# Patient Record
Sex: MALE | Race: WHITE | NOT HISPANIC OR LATINO | Employment: UNEMPLOYED | URBAN - METROPOLITAN AREA
[De-identification: names, ages, dates, MRNs, and addresses within clinical notes are randomized per-mention and may not be internally consistent; named-entity substitution may affect disease eponyms.]

---

## 2021-04-29 ENCOUNTER — APPOINTMENT (EMERGENCY)
Dept: RADIOLOGY | Facility: HOSPITAL | Age: 61
End: 2021-04-29
Payer: COMMERCIAL

## 2021-04-29 ENCOUNTER — HOSPITAL ENCOUNTER (EMERGENCY)
Facility: HOSPITAL | Age: 61
Discharge: HOME | End: 2021-04-30
Attending: EMERGENCY MEDICINE
Payer: COMMERCIAL

## 2021-04-29 VITALS
DIASTOLIC BLOOD PRESSURE: 77 MMHG | OXYGEN SATURATION: 99 % | RESPIRATION RATE: 16 BRPM | TEMPERATURE: 98.7 F | HEART RATE: 64 BPM | SYSTOLIC BLOOD PRESSURE: 141 MMHG

## 2021-04-29 DIAGNOSIS — M70.20 OLECRANON BURSITIS: ICD-10-CM

## 2021-04-29 DIAGNOSIS — M25.521 ELBOW PAIN, RIGHT: Primary | ICD-10-CM

## 2021-04-29 LAB
ANION GAP SERPL CALCULATED.3IONS-SCNC: 7 MMOL/L (ref 4–13)
BASOPHILS # BLD AUTO: 0.05 THOUSANDS/ΜL (ref 0–0.1)
BASOPHILS NFR BLD AUTO: 1 % (ref 0–1)
BUN SERPL-MCNC: 15 MG/DL (ref 5–25)
CALCIUM SERPL-MCNC: 9.6 MG/DL (ref 8.3–10.1)
CHLORIDE SERPL-SCNC: 105 MMOL/L (ref 100–108)
CO2 SERPL-SCNC: 30 MMOL/L (ref 21–32)
CREAT SERPL-MCNC: 1.03 MG/DL (ref 0.6–1.3)
EOSINOPHIL # BLD AUTO: 0.09 THOUSAND/ΜL (ref 0–0.61)
EOSINOPHIL NFR BLD AUTO: 2 % (ref 0–6)
ERYTHROCYTE [DISTWIDTH] IN BLOOD BY AUTOMATED COUNT: 13.5 % (ref 11.6–15.1)
GFR SERPL CREATININE-BSD FRML MDRD: 79 ML/MIN/1.73SQ M
GLUCOSE SERPL-MCNC: 104 MG/DL (ref 65–140)
HCT VFR BLD AUTO: 46.8 % (ref 36.5–49.3)
HGB BLD-MCNC: 15.5 G/DL (ref 12–17)
IMM GRANULOCYTES # BLD AUTO: 0.04 THOUSAND/UL (ref 0–0.2)
IMM GRANULOCYTES NFR BLD AUTO: 1 % (ref 0–2)
LYMPHOCYTES # BLD AUTO: 1.31 THOUSANDS/ΜL (ref 0.6–4.47)
LYMPHOCYTES NFR BLD AUTO: 22 % (ref 14–44)
MCH RBC QN AUTO: 30.3 PG (ref 26.8–34.3)
MCHC RBC AUTO-ENTMCNC: 33.1 G/DL (ref 31.4–37.4)
MCV RBC AUTO: 92 FL (ref 82–98)
MONOCYTES # BLD AUTO: 0.62 THOUSAND/ΜL (ref 0.17–1.22)
MONOCYTES NFR BLD AUTO: 10 % (ref 4–12)
NEUTROPHILS # BLD AUTO: 3.84 THOUSANDS/ΜL (ref 1.85–7.62)
NEUTS SEG NFR BLD AUTO: 64 % (ref 43–75)
NRBC BLD AUTO-RTO: 0 /100 WBCS
PLATELET # BLD AUTO: 147 THOUSANDS/UL (ref 149–390)
PMV BLD AUTO: 9.9 FL (ref 8.9–12.7)
POTASSIUM SERPL-SCNC: 4 MMOL/L (ref 3.5–5.3)
RBC # BLD AUTO: 5.11 MILLION/UL (ref 3.88–5.62)
SODIUM SERPL-SCNC: 142 MMOL/L (ref 136–145)
WBC # BLD AUTO: 5.95 THOUSAND/UL (ref 4.31–10.16)

## 2021-04-29 PROCEDURE — 96375 TX/PRO/DX INJ NEW DRUG ADDON: CPT

## 2021-04-29 PROCEDURE — 85025 COMPLETE CBC W/AUTO DIFF WBC: CPT | Performed by: EMERGENCY MEDICINE

## 2021-04-29 PROCEDURE — 99284 EMERGENCY DEPT VISIT MOD MDM: CPT

## 2021-04-29 PROCEDURE — 80048 BASIC METABOLIC PNL TOTAL CA: CPT | Performed by: EMERGENCY MEDICINE

## 2021-04-29 PROCEDURE — 96374 THER/PROPH/DIAG INJ IV PUSH: CPT

## 2021-04-29 PROCEDURE — 73080 X-RAY EXAM OF ELBOW: CPT

## 2021-04-29 PROCEDURE — 36415 COLL VENOUS BLD VENIPUNCTURE: CPT | Performed by: EMERGENCY MEDICINE

## 2021-04-29 PROCEDURE — 99285 EMERGENCY DEPT VISIT HI MDM: CPT | Performed by: EMERGENCY MEDICINE

## 2021-04-29 RX ORDER — KETOROLAC TROMETHAMINE 30 MG/ML
15 INJECTION, SOLUTION INTRAMUSCULAR; INTRAVENOUS ONCE
Status: COMPLETED | OUTPATIENT
Start: 2021-04-29 | End: 2021-04-29

## 2021-04-29 RX ORDER — MORPHINE SULFATE 4 MG/ML
4 INJECTION, SOLUTION INTRAMUSCULAR; INTRAVENOUS ONCE
Status: COMPLETED | OUTPATIENT
Start: 2021-04-30 | End: 2021-04-30

## 2021-04-29 RX ORDER — ACETAMINOPHEN 325 MG/1
975 TABLET ORAL ONCE
Status: COMPLETED | OUTPATIENT
Start: 2021-04-29 | End: 2021-04-29

## 2021-04-29 RX ADMIN — ACETAMINOPHEN 975 MG: 325 TABLET, FILM COATED ORAL at 22:49

## 2021-04-29 RX ADMIN — DICLOFENAC SODIUM 4 G: 10 GEL TOPICAL at 22:56

## 2021-04-29 RX ADMIN — KETOROLAC TROMETHAMINE 15 MG: 30 INJECTION, SOLUTION INTRAMUSCULAR at 22:55

## 2021-04-29 RX ADMIN — MORPHINE SULFATE 2 MG: 2 INJECTION, SOLUTION INTRAMUSCULAR; INTRAVENOUS at 22:55

## 2021-04-29 NOTE — Clinical Note
accompanied Akhil Thornton to the emergency department on 4/29/2021  Return date if applicable: 40/75/3302        If you have any questions or concerns, please don't hesitate to call        René Rossi RN

## 2021-04-30 PROCEDURE — 96375 TX/PRO/DX INJ NEW DRUG ADDON: CPT

## 2021-04-30 RX ORDER — OXYCODONE HYDROCHLORIDE 5 MG/1
5 TABLET ORAL EVERY 8 HOURS PRN
Qty: 8 TABLET | Refills: 0 | Status: ON HOLD | OUTPATIENT
Start: 2021-04-30 | End: 2021-05-04

## 2021-04-30 RX ORDER — ACETAMINOPHEN 325 MG/1
650 TABLET ORAL EVERY 6 HOURS PRN
Qty: 30 TABLET | Refills: 0 | Status: SHIPPED | OUTPATIENT
Start: 2021-04-30 | End: 2021-05-05

## 2021-04-30 RX ADMIN — MORPHINE SULFATE 4 MG: 4 INJECTION INTRAVENOUS at 00:02

## 2021-04-30 NOTE — ED PROVIDER NOTES
History  Chief Complaint   Patient presents with    Elbow Swelling     pt reports r elbow pain for a few days no known injury  HPI    Patient is a pleasant 78-year-old male that reports to the emergency department with right elbow pain that started today  Patient notes that he works as a  and is constantly using his elbows  No fevers, chills, sweats, surrounding erythema  He does have pain when palpating the edematous olecranon bursa  Some fluctuance noted  Medical decision makin-year-old male, suspect nonseptic olecranon bursitis given lack of erythema, lack of warmth, patient's occupation, however, will discussed possibly performing a tap to rule out septic bursitis  No pain with micro movement of the elbow to suggest a septic joint  None       No past medical history on file  No past surgical history on file  No family history on file  I have reviewed and agree with the history as documented  E-Cigarette/Vaping    E-Cigarette Use Never User      E-Cigarette/Vaping Substances     Social History     Tobacco Use    Smoking status: Current Every Day Smoker     Packs/day: 0 50    Smokeless tobacco: Never Used   Substance Use Topics    Alcohol use: Yes     Frequency: 2-3 times a week     Drinks per session: 3 or 4    Drug use: Not on file       Review of Systems   Musculoskeletal: Positive for arthralgias  All other systems reviewed and are negative  Physical Exam  Physical Exam  Vitals signs and nursing note reviewed  Constitutional:       Appearance: He is well-developed  He is not diaphoretic  HENT:      Head: Normocephalic and atraumatic  Right Ear: External ear normal       Left Ear: External ear normal       Nose: No congestion  Eyes:      General:         Right eye: No discharge  Left eye: No discharge  Extraocular Movements: Extraocular movements intact  Neck:      Musculoskeletal: Normal range of motion and neck supple  Cardiovascular:      Rate and Rhythm: Normal rate and regular rhythm  Heart sounds: Normal heart sounds  No murmur  Pulmonary:      Effort: Pulmonary effort is normal  No respiratory distress  Breath sounds: Normal breath sounds  No wheezing  Abdominal:      General: There is no distension  Palpations: Abdomen is soft  Tenderness: There is no abdominal tenderness  Musculoskeletal:         General: Swelling and tenderness present  No signs of injury  Right lower leg: No edema  Left lower leg: No edema  Skin:     General: Skin is warm and dry  Findings: No erythema  Neurological:      General: No focal deficit present  Mental Status: He is alert and oriented to person, place, and time  Motor: No weakness  Psychiatric:         Mood and Affect: Mood normal          Behavior: Behavior normal          Vital Signs  ED Triage Vitals [04/29/21 2229]   Temperature Pulse Respirations Blood Pressure SpO2   98 7 °F (37 1 °C) 65 18 167/92 99 %      Temp Source Heart Rate Source Patient Position - Orthostatic VS BP Location FiO2 (%)   Oral Monitor Sitting Left arm --      Pain Score       Worst Possible Pain           Vitals:    04/29/21 2229   BP: 167/92   Pulse: 65   Patient Position - Orthostatic VS: Sitting         Visual Acuity      ED Medications  Medications   ketorolac (TORADOL) injection 15 mg (15 mg Intravenous Given 4/29/21 2255)   acetaminophen (TYLENOL) tablet 975 mg (975 mg Oral Given 4/29/21 2249)   Diclofenac Sodium (VOLTAREN) 1 % topical gel 4 g (4 g Topical Given 4/29/21 2256)   morphine injection 2 mg (2 mg Intravenous Given 4/29/21 2255)       Diagnostic Studies  Results Reviewed     Procedure Component Value Units Date/Time    CBC and differential [[id number]] Collected: 04/29/21 2256    Lab Status:  In process Specimen: Blood from Arm, Left Updated: 04/29/21 2308    Synovial fluid, white cell count w/ diff [[id number]]     Lab Status: No result Specimen: Synovial Fluid     Synovial fluid, crystal [[id number]]     Lab Status: No result Specimen: Synovial Fluid     Synovial fluid, Culture and Gram stain [[id number]]     Lab Status: No result Specimen: Body Fluid     STAT Gram Stain [[id number]]     Lab Status: No result Specimen: Other     Basic metabolic panel [[id number]] Collected: 04/29/21 2256    Lab Status: In process Specimen: Blood from Arm, Left Updated: 04/29/21 2306                 XR elbow 3+ vw RIGHT    (Results Pending)              Procedures  Procedures         ED Course                             SBIRT 22yo+      Most Recent Value   SBIRT (25 yo +)   In order to provide better care to our patients, we are screening all of our patients for alcohol and drug use  Would it be okay to ask you these screening questions? Unable to answer at this time Filed at: 04/29/2021 3754                    MDM    Disposition  Final diagnoses:   None     ED Disposition     None      Follow-up Information    None         Patient's Medications    No medications on file     No discharge procedures on file      PDMP Review     None          ED Provider  Electronically Signed by           Sara Keith MD  04/29/21 7915

## 2021-05-01 ENCOUNTER — HOSPITAL ENCOUNTER (INPATIENT)
Facility: HOSPITAL | Age: 61
LOS: 2 days | Discharge: HOME | DRG: 558 | End: 2021-05-04
Attending: EMERGENCY MEDICINE | Admitting: INTERNAL MEDICINE
Payer: COMMERCIAL

## 2021-05-01 DIAGNOSIS — R07.9 CHEST PAIN: ICD-10-CM

## 2021-05-01 DIAGNOSIS — M10.9 GOUT: ICD-10-CM

## 2021-05-01 DIAGNOSIS — L03.90 CELLULITIS: ICD-10-CM

## 2021-05-01 DIAGNOSIS — L03.113 CELLULITIS OF RIGHT ELBOW: ICD-10-CM

## 2021-05-01 DIAGNOSIS — Z72.0 TOBACCO ABUSE: ICD-10-CM

## 2021-05-01 DIAGNOSIS — I10 ESSENTIAL HYPERTENSION: ICD-10-CM

## 2021-05-01 DIAGNOSIS — L03.113 CELLULITIS OF RIGHT ELBOW: Primary | ICD-10-CM

## 2021-05-01 DIAGNOSIS — M70.20 OLECRANON BURSITIS: ICD-10-CM

## 2021-05-01 PROCEDURE — 99285 EMERGENCY DEPT VISIT HI MDM: CPT

## 2021-05-01 PROCEDURE — 93005 ELECTROCARDIOGRAM TRACING: CPT

## 2021-05-01 NOTE — LETTER
[address] [facility]  [facility] [address]  Dept: [phone]    May 4, 2021     Patient: Roxanna Browne   YOB: 1960   Date of Visit: 5/1/2021       To Whom it May Concern:    Roxanna Browne is under my professional care  He was seen in the hospital from 5/1/2021   to 05/04/21  He may return to work on 5/12/21 without limitations  If you have any questions or concerns, please don't hesitate to call           Sincerely,          Sapna Solorio PA-C  [phone]

## 2021-05-02 ENCOUNTER — APPOINTMENT (EMERGENCY)
Dept: RADIOLOGY | Facility: HOSPITAL | Age: 61
DRG: 558 | End: 2021-05-02
Payer: COMMERCIAL

## 2021-05-02 PROBLEM — L03.113 CELLULITIS OF RIGHT ELBOW: Status: ACTIVE | Noted: 2021-05-02

## 2021-05-02 PROBLEM — R07.9 CHEST PAIN: Status: ACTIVE | Noted: 2021-05-02

## 2021-05-02 LAB
ANION GAP SERPL CALCULATED.3IONS-SCNC: 7 MMOL/L (ref 4–13)
ANION GAP SERPL CALCULATED.3IONS-SCNC: 8 MMOL/L (ref 4–13)
ATRIAL RATE: 65 BPM
BASOPHILS # BLD AUTO: 0.02 THOUSANDS/ΜL (ref 0–0.1)
BASOPHILS NFR BLD AUTO: 0 % (ref 0–1)
BUN SERPL-MCNC: 7 MG/DL (ref 5–25)
BUN SERPL-MCNC: 9 MG/DL (ref 5–25)
CALCIUM SERPL-MCNC: 9.5 MG/DL (ref 8.3–10.1)
CALCIUM SERPL-MCNC: 9.9 MG/DL (ref 8.3–10.1)
CHLORIDE SERPL-SCNC: 103 MMOL/L (ref 100–108)
CHLORIDE SERPL-SCNC: 105 MMOL/L (ref 100–108)
CO2 SERPL-SCNC: 27 MMOL/L (ref 21–32)
CO2 SERPL-SCNC: 28 MMOL/L (ref 21–32)
CREAT SERPL-MCNC: 0.8 MG/DL (ref 0.6–1.3)
CREAT SERPL-MCNC: 0.95 MG/DL (ref 0.6–1.3)
CRP SERPL QL: 121 MG/L
EOSINOPHIL # BLD AUTO: 0.05 THOUSAND/ΜL (ref 0–0.61)
EOSINOPHIL NFR BLD AUTO: 1 % (ref 0–6)
ERYTHROCYTE [DISTWIDTH] IN BLOOD BY AUTOMATED COUNT: 13.7 % (ref 11.6–15.1)
ERYTHROCYTE [DISTWIDTH] IN BLOOD BY AUTOMATED COUNT: 13.8 % (ref 11.6–15.1)
ERYTHROCYTE [SEDIMENTATION RATE] IN BLOOD: 22 MM/HOUR (ref 0–19)
GFR SERPL CREATININE-BSD FRML MDRD: 87 ML/MIN/1.73SQ M
GFR SERPL CREATININE-BSD FRML MDRD: 97 ML/MIN/1.73SQ M
GLUCOSE SERPL-MCNC: 113 MG/DL (ref 65–140)
GLUCOSE SERPL-MCNC: 118 MG/DL (ref 65–140)
HCT VFR BLD AUTO: 43.7 % (ref 36.5–49.3)
HCT VFR BLD AUTO: 45.2 % (ref 36.5–49.3)
HGB BLD-MCNC: 14.1 G/DL (ref 12–17)
HGB BLD-MCNC: 14.8 G/DL (ref 12–17)
IMM GRANULOCYTES # BLD AUTO: 0.03 THOUSAND/UL (ref 0–0.2)
IMM GRANULOCYTES NFR BLD AUTO: 0 % (ref 0–2)
LYMPHOCYTES # BLD AUTO: 1.32 THOUSANDS/ΜL (ref 0.6–4.47)
LYMPHOCYTES NFR BLD AUTO: 17 % (ref 14–44)
MCH RBC QN AUTO: 30.2 PG (ref 26.8–34.3)
MCH RBC QN AUTO: 30.8 PG (ref 26.8–34.3)
MCHC RBC AUTO-ENTMCNC: 32.3 G/DL (ref 31.4–37.4)
MCHC RBC AUTO-ENTMCNC: 32.7 G/DL (ref 31.4–37.4)
MCV RBC AUTO: 94 FL (ref 82–98)
MCV RBC AUTO: 94 FL (ref 82–98)
MONOCYTES # BLD AUTO: 0.82 THOUSAND/ΜL (ref 0.17–1.22)
MONOCYTES NFR BLD AUTO: 10 % (ref 4–12)
NEUTROPHILS # BLD AUTO: 5.72 THOUSANDS/ΜL (ref 1.85–7.62)
NEUTS SEG NFR BLD AUTO: 72 % (ref 43–75)
NRBC BLD AUTO-RTO: 0 /100 WBCS
P AXIS: 84 DEGREES
PLATELET # BLD AUTO: 113 THOUSANDS/UL (ref 149–390)
PLATELET # BLD AUTO: 129 THOUSANDS/UL (ref 149–390)
PMV BLD AUTO: 10.1 FL (ref 8.9–12.7)
PMV BLD AUTO: 10.7 FL (ref 8.9–12.7)
POTASSIUM SERPL-SCNC: 3.7 MMOL/L (ref 3.5–5.3)
POTASSIUM SERPL-SCNC: 3.9 MMOL/L (ref 3.5–5.3)
PR INTERVAL: 140 MS
QRS AXIS: 34 DEGREES
QRSD INTERVAL: 68 MS
QT INTERVAL: 392 MS
QTC INTERVAL: 407 MS
RBC # BLD AUTO: 4.67 MILLION/UL (ref 3.88–5.62)
RBC # BLD AUTO: 4.81 MILLION/UL (ref 3.88–5.62)
SODIUM SERPL-SCNC: 138 MMOL/L (ref 136–145)
SODIUM SERPL-SCNC: 140 MMOL/L (ref 136–145)
T WAVE AXIS: 59 DEGREES
TROPONIN I SERPL-MCNC: <0.02 NG/ML
VENTRICULAR RATE: 65 BPM
WBC # BLD AUTO: 6.87 THOUSAND/UL (ref 4.31–10.16)
WBC # BLD AUTO: 7.96 THOUSAND/UL (ref 4.31–10.16)

## 2021-05-02 PROCEDURE — 93010 ELECTROCARDIOGRAM REPORT: CPT | Performed by: INTERNAL MEDICINE

## 2021-05-02 PROCEDURE — 99285 EMERGENCY DEPT VISIT HI MDM: CPT | Performed by: EMERGENCY MEDICINE

## 2021-05-02 PROCEDURE — 85652 RBC SED RATE AUTOMATED: CPT | Performed by: EMERGENCY MEDICINE

## 2021-05-02 PROCEDURE — 36415 COLL VENOUS BLD VENIPUNCTURE: CPT | Performed by: EMERGENCY MEDICINE

## 2021-05-02 PROCEDURE — 84484 ASSAY OF TROPONIN QUANT: CPT | Performed by: PHYSICIAN ASSISTANT

## 2021-05-02 PROCEDURE — 96366 THER/PROPH/DIAG IV INF ADDON: CPT

## 2021-05-02 PROCEDURE — 86140 C-REACTIVE PROTEIN: CPT | Performed by: EMERGENCY MEDICINE

## 2021-05-02 PROCEDURE — 80048 BASIC METABOLIC PNL TOTAL CA: CPT | Performed by: EMERGENCY MEDICINE

## 2021-05-02 PROCEDURE — 96375 TX/PRO/DX INJ NEW DRUG ADDON: CPT

## 2021-05-02 PROCEDURE — 84484 ASSAY OF TROPONIN QUANT: CPT | Performed by: EMERGENCY MEDICINE

## 2021-05-02 PROCEDURE — 87040 BLOOD CULTURE FOR BACTERIA: CPT | Performed by: EMERGENCY MEDICINE

## 2021-05-02 PROCEDURE — 80048 BASIC METABOLIC PNL TOTAL CA: CPT | Performed by: PHYSICIAN ASSISTANT

## 2021-05-02 PROCEDURE — 71045 X-RAY EXAM CHEST 1 VIEW: CPT

## 2021-05-02 PROCEDURE — 96365 THER/PROPH/DIAG IV INF INIT: CPT

## 2021-05-02 PROCEDURE — 85025 COMPLETE CBC W/AUTO DIFF WBC: CPT | Performed by: EMERGENCY MEDICINE

## 2021-05-02 PROCEDURE — 96367 TX/PROPH/DG ADDL SEQ IV INF: CPT

## 2021-05-02 PROCEDURE — 99223 1ST HOSP IP/OBS HIGH 75: CPT | Performed by: INTERNAL MEDICINE

## 2021-05-02 PROCEDURE — 99254 IP/OBS CNSLTJ NEW/EST MOD 60: CPT | Performed by: ORTHOPAEDIC SURGERY

## 2021-05-02 PROCEDURE — 96376 TX/PRO/DX INJ SAME DRUG ADON: CPT

## 2021-05-02 PROCEDURE — 85027 COMPLETE CBC AUTOMATED: CPT | Performed by: PHYSICIAN ASSISTANT

## 2021-05-02 RX ORDER — LANOLIN ALCOHOL/MO/W.PET/CERES
6 CREAM (GRAM) TOPICAL
Status: DISCONTINUED | OUTPATIENT
Start: 2021-05-02 | End: 2021-05-04 | Stop reason: HOSPADM

## 2021-05-02 RX ORDER — ALLOPURINOL 100 MG/1
100 TABLET ORAL DAILY
Status: DISCONTINUED | OUTPATIENT
Start: 2021-05-02 | End: 2021-05-04 | Stop reason: HOSPADM

## 2021-05-02 RX ORDER — ALLOPURINOL 100 MG/1
100 TABLET ORAL DAILY
COMMUNITY

## 2021-05-02 RX ORDER — OXYCODONE HYDROCHLORIDE 5 MG/1
5 TABLET ORAL EVERY 6 HOURS PRN
Status: DISCONTINUED | OUTPATIENT
Start: 2021-05-02 | End: 2021-05-04 | Stop reason: HOSPADM

## 2021-05-02 RX ORDER — CEFAZOLIN SODIUM 2 G/50ML
2000 SOLUTION INTRAVENOUS EVERY 8 HOURS
Status: DISCONTINUED | OUTPATIENT
Start: 2021-05-02 | End: 2021-05-02

## 2021-05-02 RX ORDER — CEFAZOLIN SODIUM 2 G/50ML
2000 SOLUTION INTRAVENOUS ONCE
Status: COMPLETED | OUTPATIENT
Start: 2021-05-02 | End: 2021-05-02

## 2021-05-02 RX ORDER — PAROXETINE 10 MG/1
10 TABLET, FILM COATED ORAL DAILY
COMMUNITY

## 2021-05-02 RX ORDER — SIMVASTATIN 20 MG
20 TABLET ORAL
COMMUNITY

## 2021-05-02 RX ORDER — CEFAZOLIN SODIUM 2 G/50ML
2000 SOLUTION INTRAVENOUS EVERY 8 HOURS
Status: DISCONTINUED | OUTPATIENT
Start: 2021-05-02 | End: 2021-05-04 | Stop reason: HOSPADM

## 2021-05-02 RX ORDER — MORPHINE SULFATE 4 MG/ML
4 INJECTION, SOLUTION INTRAMUSCULAR; INTRAVENOUS ONCE
Status: COMPLETED | OUTPATIENT
Start: 2021-05-02 | End: 2021-05-02

## 2021-05-02 RX ORDER — HYDROMORPHONE HCL/PF 1 MG/ML
1 SYRINGE (ML) INJECTION EVERY 4 HOURS PRN
Status: DISCONTINUED | OUTPATIENT
Start: 2021-05-02 | End: 2021-05-04 | Stop reason: HOSPADM

## 2021-05-02 RX ORDER — ACETAMINOPHEN 325 MG/1
650 TABLET ORAL EVERY 6 HOURS PRN
Status: DISCONTINUED | OUTPATIENT
Start: 2021-05-02 | End: 2021-05-04

## 2021-05-02 RX ORDER — PRAVASTATIN SODIUM 40 MG
40 TABLET ORAL
Status: DISCONTINUED | OUTPATIENT
Start: 2021-05-02 | End: 2021-05-04 | Stop reason: HOSPADM

## 2021-05-02 RX ORDER — PAROXETINE HYDROCHLORIDE 20 MG/1
10 TABLET, FILM COATED ORAL DAILY
Status: DISCONTINUED | OUTPATIENT
Start: 2021-05-02 | End: 2021-05-04 | Stop reason: HOSPADM

## 2021-05-02 RX ORDER — METOPROLOL TARTRATE 50 MG/1
100 TABLET, FILM COATED ORAL EVERY 12 HOURS SCHEDULED
COMMUNITY

## 2021-05-02 RX ORDER — METOPROLOL TARTRATE 100 MG/1
100 TABLET ORAL EVERY 12 HOURS SCHEDULED
Status: DISCONTINUED | OUTPATIENT
Start: 2021-05-02 | End: 2021-05-04 | Stop reason: HOSPADM

## 2021-05-02 RX ORDER — CEFAZOLIN SODIUM 1 G/50ML
1000 SOLUTION INTRAVENOUS EVERY 8 HOURS
Status: DISCONTINUED | OUTPATIENT
Start: 2021-05-02 | End: 2021-05-02

## 2021-05-02 RX ORDER — HYDROMORPHONE HCL/PF 1 MG/ML
0.5 SYRINGE (ML) INJECTION EVERY 6 HOURS PRN
Status: DISCONTINUED | OUTPATIENT
Start: 2021-05-02 | End: 2021-05-04 | Stop reason: HOSPADM

## 2021-05-02 RX ORDER — NITROGLYCERIN 0.4 MG/1
0.4 TABLET SUBLINGUAL ONCE
Status: COMPLETED | OUTPATIENT
Start: 2021-05-02 | End: 2021-05-02

## 2021-05-02 RX ADMIN — VANCOMYCIN HYDROCHLORIDE 2000 MG: 5 INJECTION, POWDER, LYOPHILIZED, FOR SOLUTION INTRAVENOUS at 02:10

## 2021-05-02 RX ADMIN — Medication 6 MG: at 22:51

## 2021-05-02 RX ADMIN — PRAVASTATIN SODIUM 40 MG: 40 TABLET ORAL at 17:34

## 2021-05-02 RX ADMIN — HYDROMORPHONE HYDROCHLORIDE 1 MG: 1 INJECTION, SOLUTION INTRAMUSCULAR; INTRAVENOUS; SUBCUTANEOUS at 11:34

## 2021-05-02 RX ADMIN — HYDROMORPHONE HYDROCHLORIDE 0.5 MG: 1 INJECTION, SOLUTION INTRAMUSCULAR; INTRAVENOUS; SUBCUTANEOUS at 20:39

## 2021-05-02 RX ADMIN — METOPROLOL TARTRATE 100 MG: 100 TABLET, FILM COATED ORAL at 09:58

## 2021-05-02 RX ADMIN — CEFAZOLIN SODIUM 2000 MG: 2 SOLUTION INTRAVENOUS at 00:51

## 2021-05-02 RX ADMIN — NITROGLYCERIN 0.4 MG: 0.4 TABLET SUBLINGUAL at 00:50

## 2021-05-02 RX ADMIN — CEFAZOLIN SODIUM 2000 MG: 2 SOLUTION INTRAVENOUS at 10:54

## 2021-05-02 RX ADMIN — ALLOPURINOL 100 MG: 100 TABLET ORAL at 09:58

## 2021-05-02 RX ADMIN — APIXABAN 5 MG: 5 TABLET, FILM COATED ORAL at 09:58

## 2021-05-02 RX ADMIN — PAROXETINE HYDROCHLORIDE 10 MG: 20 TABLET, FILM COATED ORAL at 09:58

## 2021-05-02 RX ADMIN — CEFAZOLIN SODIUM 2000 MG: 2 SOLUTION INTRAVENOUS at 17:35

## 2021-05-02 RX ADMIN — MORPHINE SULFATE 4 MG: 4 INJECTION INTRAVENOUS at 00:47

## 2021-05-02 RX ADMIN — HYDROMORPHONE HYDROCHLORIDE 0.5 MG: 1 INJECTION, SOLUTION INTRAMUSCULAR; INTRAVENOUS; SUBCUTANEOUS at 06:44

## 2021-05-02 RX ADMIN — APIXABAN 5 MG: 5 TABLET, FILM COATED ORAL at 17:34

## 2021-05-02 RX ADMIN — METOPROLOL TARTRATE 100 MG: 100 TABLET, FILM COATED ORAL at 20:29

## 2021-05-02 RX ADMIN — MORPHINE SULFATE 4 MG: 4 INJECTION INTRAVENOUS at 02:10

## 2021-05-02 RX ADMIN — OXYCODONE HYDROCHLORIDE 5 MG: 5 TABLET ORAL at 18:34

## 2021-05-02 NOTE — H&P
Ian Hunter 1960, 61 y o  male MRN: [id number]  Unit/Bed#: S -01 Encounter: [id number]  Primary Care Provider: Armando Bearden MD   Date and time admitted to hospital: 5/1/2021 11:50 PM    * Cellulitis of right elbow  Assessment & Plan  · Presented initially on 4/29/21 with swelling of right elbow at which time bursa aspiration was attempted with only bloody drainage noted which was not sent for culture  Now returns with erythema, pain and swelling of right elbow  · Suspect right elbow cellulitis with possible septic bursitis  · X-ray of the right elbow on 4/29/21: "No acute osseous abnormality  Soft tissue swelling noted "  ·  and sed rate 22 on presentation  · Received 1 dose of IV vancomycin and cefazolin in the ED  · Patient afebrile thus far, does not meet sepsis criteria and is without prior h/o MRSA  · Conitnue IV antibioitcs with cefazolin  · Orthopedic surgery consulted  · Consider remain imaging if does not improve  · Elevate extremity  Chest pain  Assessment & Plan  · Reports intermittent left sided chest pain  · R/o ACS vs musculoskeletal pain  · ANNAMARIE score: 2  · EKG without ischemic changes  · Troponin < 0 02  · CXR with no acute findings noted; follow-up on official results in AM    · Continue to trend troponin  · Monitor on telemetry  · Obtain lipid panel  · Continue statin  Not on ASA, on Eliquis  · Consider cardiology consult if persists  Atrial fibrillation [facility])  Assessment & Plan  · S/p prior ablation  · Continue metoprolol  · AC with Eliquis  CAD (coronary artery disease)  Assessment & Plan  · 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  · Follows with cardiology, Dr lCif Perea, at [facility]  · Nuclear stress test in April 2018 was a normal study  · Continue statin and BB  Gout  Assessment & Plan  · On allopurinol       HTN (hypertension)  Assessment & Plan  · BP elevated on presentation but has since improved  · Continue to monitor BP  · Continue metoprolol  Tobacco abuse  Assessment & Plan  · Reports smoking about 1 ppd and has reportedly been smoking for the past 45 years  · Smoking cessation; nicotine patch offered and patient declined  VTE Prophylaxis: Apixaban (Eliquis)  / sequential compression device   Code Status: level 1- full code  POLST: POLST form is not discussed and not completed at this time  Anticipated Length of Stay:  Patient will be admitted on an Inpatient basis with an anticipated length of stay of  Greater than 2 midnights  Justification for Hospital Stay: cellulitis of elbow with possible septic bursitis, need for ortho eval and IV antibiotics  Total Time for Visit, including Counseling / Coordination of Care: 70 minutes  Greater than 50% of this total time spent on direct patient counseling and coordination of care  Chief Complaint:   Right elbow pain, swelling and erythema  Chest pain  History of Present Illness:    Ricardo Garcia is a 61 y o  male with a history of CAD s/p stenting, atrial fibrillation s/p ablation, pacemaker placement, HTN, gout and tobacco abuse who presents with right elbow erythema, swelling and pain  Patient was seen in the ED the evening of 4/29/21 for swelling of his right elbow which started earlier that day and a bursa aspiration was attempted with only bloody drainage, therefore, drainage was not sent for culture  He reports that since then he has had increased pain, swelling and erythema of the elbow  He notes temps up to 99 9 and chills  He has also noticed increased swelling of his right hand but no erythema  Patient admits to left sided chest pain as well recently which he describes as someone punching him in the chest  He reports this pain has been intermittent for weeks and admits to noticing it more when he is stressed   He also notes an increase in his pain in recent days with ROM of his left arm and notes that he has been using his left arm more recently given the pain in his right arm  He denies SOB, cough; admits to intermittent dizziness  Review of Systems:    Review of Systems   Constitutional: Positive for chills  Negative for fever  Respiratory: Negative for cough and shortness of breath  Cardiovascular: Positive for chest pain  Negative for palpitations and leg swelling  Gastrointestinal: Negative for abdominal pain, nausea and vomiting  Musculoskeletal: Positive for arthralgias and joint swelling  Skin: Positive for color change  Neurological: Positive for dizziness (intermittent)  All other systems reviewed and are negative  Past Medical and Surgical History:     Past Medical History:   Diagnosis Date    Atrial fibrillation ([facility] [address] )     CAD (coronary artery disease)     Gout     HTN (hypertension)     Obesity (BMI 30 0-34  9)     Tobacco abuse        History reviewed  No pertinent surgical history  Meds/Allergies:    Prior to Admission medications    Medication Sig Start Date End Date Taking?  Authorizing Provider   allopurinol (ZYLOPRIM) 100 mg tablet Take 100 mg by mouth daily   Yes Historical Provider, MD   APIXABAN PO Take 10 mg by mouth   Yes Historical Provider, MD   metoprolol tartrate (LOPRESSOR) 50 mg tablet Take 100 mg by mouth every 12 (twelve) hours   Yes Historical Provider, MD   PARoxetine (PAXIL) 10 mg tablet Take 10 mg by mouth daily   Yes Historical Provider, MD   simvastatin (ZOCOR) 20 mg tablet Take 20 mg by mouth daily at bedtime   Yes Historical Provider, MD   acetaminophen (TYLENOL) 325 mg tablet Take 2 tablets (650 mg total) by mouth every 6 (six) hours as needed for mild pain for up to 5 days 4/30/21 5/5/21  Simran Silva MD   Diclofenac Sodium (VOLTAREN) 1 % Apply 2 g topically 4 (four) times a day 4/30/21   Simran Silva MD   oxyCODONE (ROXICODONE) 5 mg immediate release tablet Take 1 tablet (5 mg total) by mouth every 8 (eight) hours as needed for moderate pain for up to 3 daysMax Daily Amount: 15 mg 4/30/21 5/3/21  Ladarius Garcia MD     I have reviewed home medications with a medical source (PCP, Pharmacy, other)  Allergies: Allergies   Allergen Reactions    Bee Venom Angioedema       Social History:     Marital Status: /Civil Union   Occupation:   Patient Pre-hospital Living Situation: home  Patient Pre-hospital Level of Mobility: independent  Patient Pre-hospital Diet Restrictions: cardiac  Substance Use History:   Social History     Substance and Sexual Activity   Alcohol Use Yes    Frequency: 2-3 times a week    Drinks per session: 3 or 4     Social History     Tobacco Use   Smoking Status Current Every Day Smoker    Packs/day: 0 50   Smokeless Tobacco Never Used     Social History     Substance and Sexual Activity   Drug Use Not on file       Family History:    History reviewed  No pertinent family history  Physical Exam:     Vitals:   Blood Pressure: 155/81 (05/02/21 0230)  Pulse: 60 (05/02/21 0515)  Temperature: 99 5 °F (37 5 °C) (05/01/21 2355)  Temp Source: Oral (05/01/21 2355)  Respirations: 16 (05/01/21 2355)  Height: 5' 7" (170 2 cm) (05/01/21 2355)  Weight - Scale: 92 7 kg (204 lb 5 9 oz) (05/01/21 2355)  SpO2: 96 % (05/02/21 0245)    Physical Exam  Vitals signs and nursing note reviewed  Constitutional:       General: He is not in acute distress  Appearance: He is not ill-appearing or diaphoretic  HENT:      Head: Normocephalic and atraumatic  Eyes:      Conjunctiva/sclera: Conjunctivae normal    Cardiovascular:      Rate and Rhythm: Normal rate and regular rhythm  Pulmonary:      Effort: Pulmonary effort is normal  No respiratory distress  Breath sounds: Normal breath sounds  Abdominal:      General: Bowel sounds are normal       Palpations: Abdomen is soft  Tenderness: There is no abdominal tenderness  Musculoskeletal:         General: Swelling (right elbow, hand) present        Right lower leg: No edema  Left lower leg: No edema  Comments: ROM of right elbow limited by pain   Skin:     General: Skin is warm and dry  Coloration: Skin is not pale  Findings: Erythema (right elbow) present  Neurological:      Mental Status: He is alert and oriented to person, place, and time  Psychiatric:         Mood and Affect: Mood normal            Additional Data:     Lab Results: I have personally reviewed pertinent reports  Results from last 7 days   Lab Units 05/02/21  0029   WBC Thousand/uL 7 96   HEMOGLOBIN g/dL 14 8   HEMATOCRIT % 45 2   PLATELETS Thousands/uL 129*   NEUTROS PCT % 72   LYMPHS PCT % 17   MONOS PCT % 10   EOS PCT % 1     Results from last 7 days   Lab Units 05/02/21  0029   SODIUM mmol/L 138   POTASSIUM mmol/L 3 9   CHLORIDE mmol/L 103   CO2 mmol/L 28   BUN mg/dL 9   CREATININE mg/dL 0 95   ANION GAP mmol/L 7   CALCIUM mg/dL 9 9   GLUCOSE RANDOM mg/dL 118                       Imaging: I have personally reviewed pertinent reports  XR chest 1 view portable    (Results Pending)       EKG, Pathology, and Other Studies Reviewed on Admission:   · EKG: NSR, no ST or T wave changes    Allscripts / Epic Records Reviewed: Yes     ** Please Note: This note has been constructed using a voice recognition system   **

## 2021-05-02 NOTE — ASSESSMENT & PLAN NOTE
· Reports smoking about 1 ppd and has reportedly been smoking for the past 45 years  · Smoking cessation; nicotine patch offered and patient declined

## 2021-05-02 NOTE — PLAN OF CARE
Problem: Potential for Falls  Goal: Patient will remain free of falls  Description: INTERVENTIONS:  - Assess patient frequently for physical needs  -  Identify cognitive and physical deficits and behaviors that affect risk of falls    -  [place] fall precautions as indicated by assessment   - Educate patient/family on patient safety including physical limitations  - Instruct patient to call for assistance with activity based on assessment  - Modify environment to reduce risk of injury  - Consider OT/PT consult to assist with strengthening/mobility  Outcome: Progressing

## 2021-05-02 NOTE — ASSESSMENT & PLAN NOTE
· Reports intermittent left sided chest pain  · R/o ACS vs musculoskeletal pain  · ANNAMARIE score: 2  · EKG without ischemic changes  · Troponin < 0 02  · CXR with no acute findings noted; follow-up on official results in AM    · Continue to trend troponin  · Monitor on telemetry  · Obtain lipid panel  · Continue statin  Not on ASA, on Eliquis  · Consider cardiology consult if persists

## 2021-05-02 NOTE — CONSULTS
Consultation - Orthopedics   Sedrick Lemon 61 y o  male MRN: [id number]  Unit/Bed#: S -01 Encounter: [id number]      Assessment/Plan     Assessment:  Right olecranon bursitis and superficial cellulitis    Plan:  There is no fluid collection over the olecranon bursa  Surgical treatment is not recommended  Continue observation with IV antibiotic  Elevation and cold compress over right elbow  Encourage elbow range of motion as tolerated  Orthopedic will continue to follow    History of Present Illness   Physician Requesting Consult: Jennifer Castaneda MD  Reason for Consult / Principal Problem:  Right elbow pain with swelling and redness  HPI: Sedrick Lemon is a 61y o  year old male who presents with right elbow pain, swelling, and redness  Patient was seen in the ER on 4/29/2021  Aspiration was done to the bursa which show bloody fluid  The fluid unfortunate is not sent for analysis and cultures  Patient denies injury to his elbow  His pain started gradually  Patient works as a  but does not remember injuring his elbow while working  Inpatient consult to Orthopedic Surgery  Consult performed by: Pedro Fox MD  Consult ordered by: Antonio Norton PA-C          Review of Systems   Constitutional: Negative  HENT: Negative  Eyes: Negative  Respiratory: Negative  Cardiovascular: Negative  Gastrointestinal: Negative  Endocrine: Negative  Genitourinary: Negative  Musculoskeletal: Positive for arthralgias (right elbow) and joint swelling (right elbow posteriorly)  Skin: Negative  Neurological: Negative  Hematological: Negative  Psychiatric/Behavioral: Negative  Historical Information   Past Medical History:   Diagnosis Date    Atrial fibrillation ([facility] [address] )     CAD (coronary artery disease)     Gout     HTN (hypertension)     Obesity (BMI 30 0-34  9)     Tobacco abuse      History reviewed  No pertinent surgical history    Social History   Social History Substance and Sexual Activity   Alcohol Use Yes    Frequency: 2-3 times a week    Drinks per session: 3 or 4     Social History     Substance and Sexual Activity   Drug Use Not on file     E-Cigarette/Vaping    E-Cigarette Use Never User      E-Cigarette/Vaping Substances     Social History     Tobacco Use   Smoking Status Current Every Day Smoker    Packs/day: 0 50   Smokeless Tobacco Never Used     Family History: non-contributory    Meds/Allergies   all current active meds have been reviewed  Allergies   Allergen Reactions    Bee Venom Angioedema       Objective   Vitals: Blood pressure 160/88, pulse 71, temperature 99 5 °F (37 5 °C), temperature source Oral, resp  rate 16, height 5' 7" (1 702 m), weight 92 7 kg (204 lb 5 9 oz), SpO2 95 %  ,Body mass index is 32 01 kg/m²  Intake/Output Summary (Last 24 hours) at 5/2/2021 0951  Last data filed at 5/2/2021 0921  Gross per 24 hour   Intake 680 ml   Output --   Net 680 ml     I/O last 24 hours: In: 65 [P O :180; IV Piggyback:500]  Out: -     Invasive Devices     Peripheral Intravenous Line            Peripheral IV 05/02/21 Left Antecubital less than 1 day                Physical Exam  Constitutional:       Appearance: Normal appearance  He is well-developed  HENT:      Head: Normocephalic and atraumatic  Right Ear: External ear normal       Left Ear: External ear normal    Eyes:      Extraocular Movements: Extraocular movements intact  Conjunctiva/sclera: Conjunctivae normal    Neck:      Musculoskeletal: Normal range of motion and neck supple  Cardiovascular:      Rate and Rhythm: Normal rate  Pulmonary:      Effort: Pulmonary effort is normal    Abdominal:      Palpations: Abdomen is soft  Skin:     General: Skin is warm  Neurological:      Mental Status: He is alert and oriented to person, place, and time  Right Elbow Exam     Tenderness   Right elbow tenderness location: Olecranon bursa       Range of Motion   Extension: abnormal   Flexion: abnormal   Pronation: normal   Supination: abnormal     Tests   Varus: negative  Valgus: negative    Other   Erythema: present (Localized in the posterior aspect of the elbow)  Scars: absent  Sensation: normal  Pulse: present    Comments:  Tender with palpation over the olecranon bursa but no fluid in the bursa          Lab Results:   CBC:   Lab Results   Component Value Date    WBC 6 87 05/02/2021    HGB 14 1 05/02/2021    HCT 43 7 05/02/2021    MCV 94 05/02/2021     (L) 05/02/2021    MCH 30 2 05/02/2021    MCHC 32 3 05/02/2021    RDW 13 8 05/02/2021    MPV 10 1 05/02/2021    NRBC 0 05/02/2021     ESR:   Lab Results   Component Value Date    ESR 22 (H) 05/02/2021     CRP:   Lab Results   Component Value Date     0 (H) 05/02/2021     Imaging Studies: I have personally reviewed pertinent films in PACS and right elbow x-ray show good joint alignment    No soft tissue calcification or DJD

## 2021-05-02 NOTE — ED PROVIDER NOTES
History  Chief Complaint   Patient presents with    Arm Pain     swelling in arm, pain, here x 2 days ago for same issue, c/o pain in chest now and inability to sleep      HPI     70-year-old male with history of CAD with 3 stents in place, atrial fibrillation on Eliquis, pacemaker in place, presenting for evaluation of pain and swelling in his right elbow as well as chest pain  Patient was seen in the emergency department 4 days ago for swelling and tenderness over the right olecranon bursa  A bursa aspiration was attempted with bloody drainage  As there is no evidence of purulence, drainage was not sent for culture  Patient denies that there is erythema in the area at that time  He was discharged home with supportive treatment  Returns today for increased pain, swelling, and erythema over the elbow  Denies fevers or chills  Additionally, patient reports chest pain which she describes as a pressure sensation in the left side of his chest   States that this is been coming and going for weeks, nonexertional and nonpleuritic  Pain is present now  He has nitroglycerin at home he has been afraid to take it  Denies shortness of breath  No history of DVT or PE and he has been compliant with his home Eliquis  Denies nausea or vomiting  No radiation of the pain to his back  Prior to Admission Medications   Prescriptions Last Dose Informant Patient Reported? Taking?    APIXABAN PO   Yes Yes   Sig: Take 10 mg by mouth   Diclofenac Sodium (VOLTAREN) 1 %   No No   Sig: Apply 2 g topically 4 (four) times a day   PARoxetine (PAXIL) 10 mg tablet   Yes Yes   Sig: Take 10 mg by mouth daily   acetaminophen (TYLENOL) 325 mg tablet   No No   Sig: Take 2 tablets (650 mg total) by mouth every 6 (six) hours as needed for mild pain for up to 5 days   allopurinol (ZYLOPRIM) 100 mg tablet   Yes Yes   Sig: Take 100 mg by mouth daily   metoprolol tartrate (LOPRESSOR) 50 mg tablet   Yes Yes   Sig: Take 100 mg by mouth every 12 (twelve) hours   oxyCODONE (ROXICODONE) 5 mg immediate release tablet   No No   Sig: Take 1 tablet (5 mg total) by mouth every 8 (eight) hours as needed for moderate pain for up to 3 daysMax Daily Amount: 15 mg   simvastatin (ZOCOR) 20 mg tablet   Yes Yes   Sig: Take 20 mg by mouth daily at bedtime      Facility-Administered Medications: None       History reviewed  No pertinent past medical history  History reviewed  No pertinent surgical history  History reviewed  No pertinent family history  I have reviewed and agree with the history as documented  E-Cigarette/Vaping    E-Cigarette Use Never User      E-Cigarette/Vaping Substances     Social History     Tobacco Use    Smoking status: Current Every Day Smoker     Packs/day: 0 50    Smokeless tobacco: Never Used   Substance Use Topics    Alcohol use: Yes     Frequency: 2-3 times a week     Drinks per session: 3 or 4    Drug use: Not on file       Review of Systems   Constitutional: Negative for chills and fever  HENT: Negative for congestion  Eyes: Negative for visual disturbance  Respiratory: Negative for cough and shortness of breath  Cardiovascular: Positive for chest pain  Negative for palpitations and leg swelling  Gastrointestinal: Negative for abdominal pain, diarrhea, nausea and vomiting  Genitourinary: Negative for dysuria and frequency  Musculoskeletal: Positive for joint swelling (R elbow)  Negative for arthralgias, back pain, neck pain and neck stiffness  Skin: Positive for rash (R elbow)  Neurological: Negative for weakness, numbness and headaches  Psychiatric/Behavioral: Negative for agitation, behavioral problems and confusion  Physical Exam  Physical Exam  Constitutional:       General: He is not in acute distress  Appearance: He is well-developed  He is not diaphoretic  HENT:      Head: Normocephalic and atraumatic        Right Ear: External ear normal       Left Ear: External ear normal  Nose: Nose normal    Eyes:      Conjunctiva/sclera: Conjunctivae normal    Neck:      Musculoskeletal: Normal range of motion and neck supple  Cardiovascular:      Rate and Rhythm: Normal rate and regular rhythm  Pulses: Normal pulses  Heart sounds: Normal heart sounds  No murmur  No friction rub  No gallop  Pulmonary:      Effort: Pulmonary effort is normal  No respiratory distress  Breath sounds: Normal breath sounds  No wheezing or rales  Abdominal:      General: Bowel sounds are normal  There is no distension  Palpations: Abdomen is soft  Tenderness: There is no abdominal tenderness  There is no guarding  Musculoskeletal: Normal range of motion  General: No deformity  Comments: Erythema and swelling over the right elbow, without fluctuance over the olecranon bursa  Range of motion of the elbow is limited by pain, though I am able to passively flex and extend his elbow with distraction  No swelling to the upper arm or forearm  No calf swelling or tenderness   Skin:     General: Skin is warm and dry  Neurological:      Mental Status: He is alert and oriented to person, place, and time  Motor: No abnormal muscle tone                   Vital Signs  ED Triage Vitals [05/01/21 2355]   Temperature Pulse Respirations Blood Pressure SpO2   99 5 °F (37 5 °C) 81 16 (!) 184/100 97 %      Temp Source Heart Rate Source Patient Position - Orthostatic VS BP Location FiO2 (%)   Oral Monitor Lying Right arm --      Pain Score       Worst Possible Pain           Vitals:    05/02/21 0211 05/02/21 0215 05/02/21 0230 05/02/21 0245   BP: 150/80  155/81    Pulse: 62 62 62 62   Patient Position - Orthostatic VS:             Visual Acuity      ED Medications  Medications   vancomycin (VANCOCIN) 2,000 mg in sodium chloride 0 9 % 500 mL IVPB (2,000 mg Intravenous New Bag 5/2/21 0210)   nitroglycerin (NITROSTAT) SL tablet 0 4 mg (0 4 mg Sublingual Given 5/2/21 0050)   ceFAZolin (ANCEF) IVPB (premix in dextrose) 2,000 mg 50 mL (0 mg Intravenous Stopped 5/2/21 0127)   morphine (PF) 4 mg/mL injection 4 mg (4 mg Intravenous Given 5/2/21 0047)   morphine (PF) 4 mg/mL injection 4 mg (4 mg Intravenous Given 5/2/21 0210)       Diagnostic Studies  Results Reviewed     Procedure Component Value Units Date/Time    C-reactive protein [[id number]]  (Abnormal) Collected: 05/02/21 0029    Lab Status: Final result Specimen: Blood from Arm, Right Updated: 05/02/21 0230      0 mg/L     Basic metabolic panel [[id number]] Collected: 05/02/21 0029    Lab Status: Final result Specimen: Blood from Arm, Right Updated: 05/02/21 0201     Sodium 138 mmol/L      Potassium 3 9 mmol/L      Chloride 103 mmol/L      CO2 28 mmol/L      ANION GAP 7 mmol/L      BUN 9 mg/dL      Creatinine 0 95 mg/dL      Glucose 118 mg/dL      Calcium 9 9 mg/dL      eGFR 87 ml/min/1 73sq m     Narrative:      [place] guidelines for Chronic Kidney Disease (CKD):     Stage 1 with normal or high GFR (GFR > 90 mL/min/1 73 square meters)    Stage 2 Mild CKD (GFR = 60-89 mL/min/1 73 square meters)    Stage 3A Moderate CKD (GFR = 45-59 mL/min/1 73 square meters)    Stage 3B Moderate CKD (GFR = 30-44 mL/min/1 73 square meters)    Stage 4 Severe CKD (GFR = 15-29 mL/min/1 73 square meters)    Stage 5 End Stage CKD (GFR <15 mL/min/1 73 square meters)  Note: GFR calculation is accurate only with a steady state creatinine    Troponin I [[id number]]  (Normal) Collected: 05/02/21 0029    Lab Status: Final result Specimen: Blood from Arm, Right Updated: 05/02/21 0129     Troponin I <0 02 ng/mL     CBC and differential [[id number]]  (Abnormal) Collected: 05/02/21 0029    Lab Status: Final result Specimen: Blood from Arm, Right Updated: 05/02/21 0116     WBC 7 96 Thousand/uL      RBC 4 81 Million/uL      Hemoglobin 14 8 g/dL      Hematocrit 45 2 %      MCV 94 fL      MCH 30 8 pg      MCHC 32 7 g/dL      RDW 13 7 %      MPV 10 7 fL      Platelets 523 Thousands/uL      nRBC 0 /100 WBCs      Neutrophils Relative 72 %      Immat GRANS % 0 %      Lymphocytes Relative 17 %      Monocytes Relative 10 %      Eosinophils Relative 1 %      Basophils Relative 0 %      Neutrophils Absolute 5 72 Thousands/µL      Immature Grans Absolute 0 03 Thousand/uL      Lymphocytes Absolute 1 32 Thousands/µL      Monocytes Absolute 0 82 Thousand/µL      Eosinophils Absolute 0 05 Thousand/µL      Basophils Absolute 0 02 Thousands/µL     Sedimentation rate, automated [[id number]]  (Abnormal) Collected: 05/02/21 0029    Lab Status: Final result Specimen: Blood from Arm, Right Updated: 05/02/21 0109     Sed Rate 22 mm/hour     Blood culture #2 [[id number]] Collected: 05/02/21 0029    Lab Status: In process Specimen: Blood from Arm, Right Updated: 05/02/21 0058    Blood culture #1 [[id number]] Collected: 05/02/21 0029    Lab Status: In process Specimen: Blood from Arm, Right Updated: 05/02/21 0058                 XR chest 1 view portable    (Results Pending)              Procedures  Procedures         ED Course             HEART Risk Score      Most Recent Value   Heart Score Risk Calculator   History  1 Filed at: 05/02/2021 0351   ECG  0 Filed at: 05/02/2021 0351   Age  1 Filed at: 05/02/2021 0351   Risk Factors  2 Filed at: 05/02/2021 0351   Troponin  0 Filed at: 05/02/2021 0351   HEART Score  4 Filed at: 05/02/2021 0351                                    MDM  Number of Diagnoses or Management Options  Cellulitis: new and requires workup  Chest pain: new and requires workup  Diagnosis management comments:     Nontoxic appearing  Afebrile and hemodynamically stable  Patient has cellulitis with mild swelling overlying the right elbow  He is unable to range the joint due to pain, however with distraction I am able to passively range the joint with minimal discomfort making my concern for septic joint low    Strongly suspect cellulitis with possible underlying septic olecranon bursitis  ESR and CRP are elevated  Patient does not meet sepsis criteria  Will not aspirate bursa at this time due to overlying cellulitis, but empirically start Ancef and vancomycin until he is able to be evaluated by Orthopedic surgery in the morning  Patient has a history of CAD and reports chest discomfort  He was given a single dose of nitroglycerin in the emergency department as well as aspirin  I personally interpreted his EKG which reveals normal rate, normal sinus rhythm, normal axis, normal intervals, Q-waves in leads V1 and V2 similar to prior, no ST segment deviation or pathologic T-wave inversions  He has intermediate risk by heart score and will require admission for delta troponins  He has been compliant with his home Eliquis and description of his symptoms are not consistent with PE  Chest x-ray with normal mediastinum and description of symptoms not consistent with aortic dissection  Will admit for cellulitis with possible septic olecranon bursitis           Amount and/or Complexity of Data Reviewed  Clinical lab tests: reviewed  Tests in the radiology section of CPT®: ordered and reviewed  Review and summarize past medical records: yes  Discuss the patient with other providers: yes  Independent visualization of images, tracings, or specimens: yes    Patient Progress  Patient progress: stable     Disposition  Final diagnoses:   Cellulitis   Chest pain     Time reflects when diagnosis was documented in both MDM as applicable and the Disposition within this note     Time User Action Codes Description Comment    5/2/2021  3:49 AM Maria Elena Braga Add [L03 90] Cellulitis     5/2/2021  3:49 AM Maria Elena Braga Add [R07 9] Chest pain       ED Disposition     ED Disposition Condition Date/Time Comment    Admit Stable Sun May 2, 2021  3:48 AM Case was discussed with DALILA and the patient's admission status was agreed to be Admission Status: inpatient status to the service of   [facility]         Follow-up Information    None         Patient's Medications   Discharge Prescriptions    No medications on file     No discharge procedures on file      PDMP Review     None          ED Provider  Electronically Signed by           Constantine Freeman MD  05/02/21 Λ  Μιχαλακοπούλου MD Juma  05/02/21 1228

## 2021-05-02 NOTE — ASSESSMENT & PLAN NOTE
· BP elevated on presentation but has since improved  · Continue to monitor BP  · Continue metoprolol

## 2021-05-02 NOTE — PLAN OF CARE
Problem: Potential for Falls  Goal: Patient will remain free of falls  Description: INTERVENTIONS:  - Assess patient frequently for physical needs  -  Identify cognitive and physical deficits and behaviors that affect risk of falls    -  [place] fall precautions as indicated by assessment   - Educate patient/family on patient safety including physical limitations  - Instruct patient to call for assistance with activity based on assessment  - Modify environment to reduce risk of injury  - Consider OT/PT consult to assist with strengthening/mobility  5/2/2021 0741 by Bobby Souza RN  Outcome: Progressing  5/2/2021 0741 by Bobby Souza RN  Outcome: Progressing

## 2021-05-02 NOTE — ASSESSMENT & PLAN NOTE
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

## 2021-05-02 NOTE — ASSESSMENT & PLAN NOTE
· Presented initially on 4/29/21 with swelling of right elbow at which time bursa aspiration was attempted with only bloody drainage noted which was not sent for culture  Now returns with erythema, pain and swelling of right elbow  · Suspect right elbow cellulitis with possible septic bursitis  · X-ray of the right elbow on 4/29/21: "No acute osseous abnormality  Soft tissue swelling noted "  ·  and sed rate 22 on presentation  · Received 1 dose of IV vancomycin and cefazolin in the ED  · Patient afebrile thus far, does not meet sepsis criteria and is without prior h/o MRSA  · Continue IV antibioitcs with cefazolin  · Orthopedic surgery consulted  · Consider repeat imaging if does not improve  · Elevate extremity

## 2021-05-03 ENCOUNTER — TELEPHONE (OUTPATIENT)
Dept: OBGYN CLINIC | Facility: HOSPITAL | Age: 61
End: 2021-05-03

## 2021-05-03 PROBLEM — R07.89 OTHER CHEST PAIN: Status: ACTIVE | Noted: 2021-05-02

## 2021-05-03 LAB
ANION GAP SERPL CALCULATED.3IONS-SCNC: 7 MMOL/L (ref 4–13)
BUN SERPL-MCNC: 9 MG/DL (ref 5–25)
CALCIUM SERPL-MCNC: 9.4 MG/DL (ref 8.3–10.1)
CHLORIDE SERPL-SCNC: 104 MMOL/L (ref 100–108)
CO2 SERPL-SCNC: 28 MMOL/L (ref 21–32)
CREAT SERPL-MCNC: 0.85 MG/DL (ref 0.6–1.3)
ERYTHROCYTE [DISTWIDTH] IN BLOOD BY AUTOMATED COUNT: 13.3 % (ref 11.6–15.1)
GFR SERPL CREATININE-BSD FRML MDRD: 95 ML/MIN/1.73SQ M
GLUCOSE SERPL-MCNC: 125 MG/DL (ref 65–140)
HCT VFR BLD AUTO: 44.8 % (ref 36.5–49.3)
HGB BLD-MCNC: 14.6 G/DL (ref 12–17)
MCH RBC QN AUTO: 30.2 PG (ref 26.8–34.3)
MCHC RBC AUTO-ENTMCNC: 32.6 G/DL (ref 31.4–37.4)
MCV RBC AUTO: 93 FL (ref 82–98)
PLATELET # BLD AUTO: 122 THOUSANDS/UL (ref 149–390)
PMV BLD AUTO: 10.2 FL (ref 8.9–12.7)
POTASSIUM SERPL-SCNC: 3.7 MMOL/L (ref 3.5–5.3)
RBC # BLD AUTO: 4.84 MILLION/UL (ref 3.88–5.62)
SODIUM SERPL-SCNC: 139 MMOL/L (ref 136–145)
WBC # BLD AUTO: 5.66 THOUSAND/UL (ref 4.31–10.16)

## 2021-05-03 PROCEDURE — 99231 SBSQ HOSP IP/OBS SF/LOW 25: CPT | Performed by: PHYSICIAN ASSISTANT

## 2021-05-03 PROCEDURE — 99232 SBSQ HOSP IP/OBS MODERATE 35: CPT | Performed by: INTERNAL MEDICINE

## 2021-05-03 PROCEDURE — 80048 BASIC METABOLIC PNL TOTAL CA: CPT | Performed by: INTERNAL MEDICINE

## 2021-05-03 PROCEDURE — 85027 COMPLETE CBC AUTOMATED: CPT | Performed by: INTERNAL MEDICINE

## 2021-05-03 RX ORDER — DOCUSATE SODIUM 100 MG/1
100 CAPSULE, LIQUID FILLED ORAL 2 TIMES DAILY
Status: DISCONTINUED | OUTPATIENT
Start: 2021-05-03 | End: 2021-05-04 | Stop reason: HOSPADM

## 2021-05-03 RX ORDER — POLYETHYLENE GLYCOL 3350 17 G/17G
17 POWDER, FOR SOLUTION ORAL DAILY PRN
Status: DISCONTINUED | OUTPATIENT
Start: 2021-05-03 | End: 2021-05-04 | Stop reason: HOSPADM

## 2021-05-03 RX ORDER — LABETALOL 20 MG/4 ML (5 MG/ML) INTRAVENOUS SYRINGE
10 EVERY 4 HOURS PRN
Status: DISCONTINUED | OUTPATIENT
Start: 2021-05-03 | End: 2021-05-04 | Stop reason: HOSPADM

## 2021-05-03 RX ORDER — LISINOPRIL 10 MG/1
10 TABLET ORAL DAILY
Status: DISCONTINUED | OUTPATIENT
Start: 2021-05-03 | End: 2021-05-04 | Stop reason: HOSPADM

## 2021-05-03 RX ORDER — SENNOSIDES 8.6 MG
1 TABLET ORAL
Status: DISCONTINUED | OUTPATIENT
Start: 2021-05-03 | End: 2021-05-04 | Stop reason: HOSPADM

## 2021-05-03 RX ADMIN — METOPROLOL TARTRATE 100 MG: 100 TABLET, FILM COATED ORAL at 21:36

## 2021-05-03 RX ADMIN — HYDROMORPHONE HYDROCHLORIDE 0.5 MG: 1 INJECTION, SOLUTION INTRAMUSCULAR; INTRAVENOUS; SUBCUTANEOUS at 15:30

## 2021-05-03 RX ADMIN — APIXABAN 5 MG: 5 TABLET, FILM COATED ORAL at 09:13

## 2021-05-03 RX ADMIN — DOCUSATE SODIUM 100 MG: 100 CAPSULE, LIQUID FILLED ORAL at 11:17

## 2021-05-03 RX ADMIN — OXYCODONE HYDROCHLORIDE 5 MG: 5 TABLET ORAL at 01:43

## 2021-05-03 RX ADMIN — Medication 6 MG: at 21:36

## 2021-05-03 RX ADMIN — PAROXETINE HYDROCHLORIDE 10 MG: 20 TABLET, FILM COATED ORAL at 09:13

## 2021-05-03 RX ADMIN — DOCUSATE SODIUM 100 MG: 100 CAPSULE, LIQUID FILLED ORAL at 18:07

## 2021-05-03 RX ADMIN — METOPROLOL TARTRATE 100 MG: 100 TABLET, FILM COATED ORAL at 09:13

## 2021-05-03 RX ADMIN — ALLOPURINOL 100 MG: 100 TABLET ORAL at 09:13

## 2021-05-03 RX ADMIN — CEFAZOLIN SODIUM 2000 MG: 2 SOLUTION INTRAVENOUS at 01:36

## 2021-05-03 RX ADMIN — LISINOPRIL 10 MG: 10 TABLET ORAL at 11:17

## 2021-05-03 RX ADMIN — SENNOSIDES 8.6 MG: 8.6 TABLET, FILM COATED ORAL at 21:36

## 2021-05-03 RX ADMIN — PRAVASTATIN SODIUM 40 MG: 40 TABLET ORAL at 15:31

## 2021-05-03 RX ADMIN — CEFAZOLIN SODIUM 2000 MG: 2 SOLUTION INTRAVENOUS at 18:08

## 2021-05-03 RX ADMIN — HYDROMORPHONE HYDROCHLORIDE 0.5 MG: 1 INJECTION, SOLUTION INTRAMUSCULAR; INTRAVENOUS; SUBCUTANEOUS at 09:17

## 2021-05-03 RX ADMIN — HYDROMORPHONE HYDROCHLORIDE 0.5 MG: 1 INJECTION, SOLUTION INTRAMUSCULAR; INTRAVENOUS; SUBCUTANEOUS at 21:36

## 2021-05-03 RX ADMIN — POLYETHYLENE GLYCOL 3350 17 G: 17 POWDER, FOR SOLUTION ORAL at 13:53

## 2021-05-03 RX ADMIN — HYDROMORPHONE HYDROCHLORIDE 1 MG: 1 INJECTION, SOLUTION INTRAMUSCULAR; INTRAVENOUS; SUBCUTANEOUS at 05:25

## 2021-05-03 RX ADMIN — APIXABAN 5 MG: 5 TABLET, FILM COATED ORAL at 18:07

## 2021-05-03 RX ADMIN — CEFAZOLIN SODIUM 2000 MG: 2 SOLUTION INTRAVENOUS at 11:18

## 2021-05-03 NOTE — UTILIZATION REVIEW
Initial Clinical Review    Admission: Date/Time/Statement:   Admission Orders (From admission, onward)     Ordered        05/02/21 0349  Inpatient Admission  Once                   Orders Placed This Encounter   Procedures    Inpatient Admission     Standing Status:   Standing     Number of Occurrences:   1     Order Specific Question:   Level of Care     Answer:   Med Surg [16]     Order Specific Question:   Estimated length of stay     Answer:   More than 2 Midnights     Order Specific Question:   Certification     Answer:   I certify that inpatient services are medically necessary for this patient for a duration of greater than two midnights  See H&P and MD Progress Notes for additional information about the patient's course of treatment  ED Arrival Information     Expected Arrival Acuity Means of Arrival Escorted By Service Admission Type    - 5/1/2021 23:47 Urgent Walk-In Family Member General Medicine Urgent    Arrival Complaint    Arm swelling/pain,chest pain        Chief Complaint   Patient presents with    Arm Pain     swelling in arm, pain, here x 2 days ago for same issue, c/o pain in chest now and inability to sleep        Initial Presentation: This is a 61year old male from home to ED admitted inpatient due to  Olecranon bursitis and surrounding cellulitis  Patient seen in ED in 4/29/2021 for same and aspiration attempted and yielded bloody drainage, no culture  Presented due to increased pain, swelling and erythema over right elbow  Has had intermittent pressure sensation left side chest, starting weeks ago and present on arrival to ED  On exam erythema and swelling over right elbow, ROM limited by pain  ANNAMARIE 2   0  Sed rate 22  Troponin < 0 02  In the ED blood cultures done  In the ED given ntg, Ancef, vancomycin and morphine twice  Plan is continued Ancef, trend troponin, telemetry  Continue statin, Eliquis and beta blocker         Per orthopedics 5/2/2021  : Patient with right olecranon bursitis and cellulitis  Recommend no surgical treatment and continued antibiotics; elevation and cold compress over right elbow, ROM as tolerated  Date: 5/3/2021    Day 2:  Patient with ongoing right elbow pain, rates 9/10 and swelling with limited ROM  On exam right elbow, mild soft tissue swelling and erythema over the posterior elbow and olecranon  Elbow range of motion is flexion 90, extension -10  Aspiration not indicated with no fluctuance  Continue antibiotics and pain control continue, has used 5 doses IV analgesia thus far        ED Triage Vitals [05/01/21 2355]   Temperature Pulse Respirations Blood Pressure SpO2   99 5 °F (37 5 °C) 81 16 (!) 184/100 97 %      Temp Source Heart Rate Source Patient Position - Orthostatic VS BP Location FiO2 (%)   Oral Monitor Lying Right arm --      Pain Score       Worst Possible Pain          Wt Readings from Last 1 Encounters:   05/01/21 92 7 kg (204 lb 5 9 oz)     Additional Vital Signs:   05/03/21 0715  97 9 °F (36 6 °C)  62  16  165/93  --  100 %  None (Room air)  Sitting   05/03/21 0138  98 °F (36 7 °C)  64  --  164/90  --  --  --  Lying   05/02/21 2234  98 5 °F (36 9 °C)  62  16  161/82  --  95 %  None (Room air)  Lying   05/02/21 2029  --  62  --  187/89Abnormal   --  --  --  --   05/02/21 1423  98 6 °F (37 °C)  61  16  122/68  90  91 %  None (Room air)  Lying   05/02/21 1049  98 1 °F (36 7 °C)  --  --  --  --  --  --  --   05/02/21 0649  --  71  --  160/88  --  95 %  None (Room air)         Pertinent Labs/Diagnostic Test Results:   5/1/2021 ECG - Normal sinus rhythm  Normal ECG  When compared with ECG of 19-DEC-2003 14:55,  Previous ECG has undetermined rhythm, needs review  QT has shortened    5/2/2021 CxR - No acute cardiopulmonary disease  4/29/2021 Xray right elbow - No acute osseous abnormality  Soft tissue swelling noted    Results from last 7 days   Lab Units 05/03/21  0445 05/02/21  5836 05/02/21  0029 04/29/21  2253 WBC Thousand/uL 5 66 6 87 7 96 5 95   HEMOGLOBIN g/dL 14 6 14 1 14 8 15 5   HEMATOCRIT % 44 8 43 7 45 2 46 8   PLATELETS Thousands/uL 122* 113* 129* 147*   NEUTROS ABS Thousands/µL  --   --  5 72 3 84     Results from last 7 days   Lab Units 05/03/21  0445 05/02/21  0647 05/02/21  0029 04/29/21  2256   SODIUM mmol/L 139 140 138 142   POTASSIUM mmol/L 3 7 3 7 3 9 4 0   CHLORIDE mmol/L 104 105 103 105   CO2 mmol/L 28 27 28 30   ANION GAP mmol/L 7 8 7 7   BUN mg/dL 9 7 9 15   CREATININE mg/dL 0 85 0 80 0 95 1 03   EGFR ml/min/1 73sq m 95 97 87 79   CALCIUM mg/dL 9 4 9 5 9 9 9 6     Results from last 7 days   Lab Units 05/03/21  0445 05/02/21  0647 05/02/21  0029 04/29/21  2256   GLUCOSE RANDOM mg/dL 125 113 118 104     Results from last 7 days   Lab Units 05/02/21  1332 05/02/21  1021 05/02/21  0647 05/02/21  0029   TROPONIN I ng/mL <0 02 <0 02 <0 02 <0 02     Results from last 7 days   Lab Units 05/02/21  0029   CRP mg/L 121 0*   SED RATE mm/hour 22*         Results from last 7 days   Lab Units 05/02/21  0029   BLOOD CULTURE  No Growth at 24 hrs  No Growth at 24 hrs  ED Treatment:   Medication Administration from 05/01/2021 2347 to 05/02/2021 0600       Date/Time Order Dose Route Action Comments     05/02/2021 0050 nitroglycerin (NITROSTAT) SL tablet 0 4 mg 0 4 mg Sublingual Given      05/02/2021 0051 ceFAZolin (ANCEF) IVPB (premix in dextrose) 2,000 mg 50 mL 2,000 mg Intravenous New Bag      05/02/2021 0047 morphine (PF) 4 mg/mL injection 4 mg 4 mg Intravenous Given      05/02/2021 0210 vancomycin (VANCOCIN) 2,000 mg in sodium chloride 0 9 % 500 mL IVPB 2,000 mg Intravenous New Bag      05/02/2021 0210 morphine (PF) 4 mg/mL injection 4 mg 4 mg Intravenous Given         Past Medical History:   Diagnosis Date    Atrial fibrillation ([facility] )     CAD (coronary artery disease)     Gout     HTN (hypertension)     Obesity (BMI 30 0-34  9)     Tobacco abuse      Present on Admission:   [facility]   CAD (coronary artery disease)   Gout   HTN (hypertension)   Tobacco abuse      Admitting Diagnosis: Arm pain [M79 603]  Cellulitis [L03 90]  Chest pain [R07 9]  Arm swelling [M79 89]  Cellulitis of right elbow [C08 542]  Age/Sex: 61 y o  male  Admission Orders:  5/2/2021 0349 inpatient   Scheduled Medications:  allopurinol, 100 mg, Oral, Daily  apixaban, 5 mg, Oral, BID  cefazolin, 2,000 mg, Intravenous, Q8H  docusate sodium, 100 mg, Oral, BID  lisinopril, 10 mg, Oral, Daily  melatonin, 6 mg, Oral, HS  metoprolol tartrate, 100 mg, Oral, Q12H LEYLA  PARoxetine, 10 mg, Oral, Daily  pravastatin, 40 mg, Oral, Daily With Dinner  senna, 1 tablet, Oral, HS    Continuous IV Infusions: none     PRN Meds:  acetaminophen, 650 mg, Oral, Q6H PRN  HYDROmorphone, 0 5 mg, Intravenous, Q6H PRN - used x 3 (5712, 2326, 4920)  HYDROmorphone, 1 mg, Intravenous, Q4H PRN - used x 2 (8234, 6016)  Labetalol HCl, 10 mg, Intravenous, Q4H PRN  oxyCODONE, 5 mg, Oral, Q6H PRN - used x 2    polyethylene glycol, 17 g, Oral, Daily PRN    IP CONSULT TO ORTHOPEDIC SURGERY    Network Utilization Review Department  ATTENTION: Please call with any questions or concerns to [phone] and carefully listen to the prompts so that you are directed to the right person  All voicemails are confidential   Mary Ann Barreto all requests for admission clinical reviews, approved or denied determinations and any other requests to dedicated fax number below belonging to the campus where the patient is receiving treatment   List of dedicated fax numbers for the Facilities:  [address] DENIALS (Administrative/Medical Necessity) [phone]   [address] (Maternity/NICU/Pediatrics) 261 [facility] [address] [place] [address] [address] [place] [address] [address] [facility] [phone]

## 2021-05-03 NOTE — ASSESSMENT & PLAN NOTE
· Reports intermittent left sided chest pain  · Likely related to musculoskeletal pain due to overuse of left arm  · ANNAMARIE score: 2  · EKG without ischemic changes  · Troponin < 0 02  · CXR with no acute findings noted  · Continue statin  Not on ASA, on Eliquis

## 2021-05-03 NOTE — ASSESSMENT & PLAN NOTE
· Presented initially on 4/29/21 with swelling of right elbow at which time bursa aspiration was attempted with only bloody drainage noted which was not sent for culture  Now returns with erythema, pain and swelling of right elbow  · Suspect right elbow cellulitis with possible septic bursitis  · X-ray of the right elbow on 4/29/21: "No acute osseous abnormality  Soft tissue swelling noted "  ·  and sed rate 22 on presentation  · Received 1 dose of IV vancomycin and cefazolin in the ED  · Patient afebrile thus far, does not meet sepsis criteria and is without prior h/o MRSA  · Continue IV antibioitcs with cefazolin  · Seen by orthopedics, no drainable collection  · Continue pain control  · Elevate extremity

## 2021-05-03 NOTE — PROGRESS NOTES
Orthopedics         Subjective:  Patient seen and evaluated  He still notes right elbow pain and swelling with limited range of motion  No fevers or chills  No numbness or tingling      Labs:  0   Lab Value Date/Time    HCT 44 8 05/03/2021 0445    HCT 43 7 05/02/2021 0647    HCT 45 2 05/02/2021 0029    HGB 14 6 05/03/2021 0445    HGB 14 1 05/02/2021 0647    HGB 14 8 05/02/2021 0029    WBC 5 66 05/03/2021 0445    WBC 6 87 05/02/2021 0647    WBC 7 96 05/02/2021 0029    ESR 22 (H) 05/02/2021 0029     0 (H) 05/02/2021 0029       Meds:    Current Facility-Administered Medications:     acetaminophen (TYLENOL) tablet 650 mg, 650 mg, Oral, Q6H PRN, Kirit Esquivel PA-C    allopurinol (ZYLOPRIM) tablet 100 mg, 100 mg, Oral, Daily, Lina Boyd PA-C, 100 mg at 05/02/21 3472    apixaban (ELIQUIS) tablet 5 mg, 5 mg, Oral, BID, Lina Boyd PA-C, 5 mg at 05/02/21 1734    ceFAZolin (ANCEF) IVPB (premix in dextrose) 2,000 mg 50 mL, 2,000 mg, Intravenous, Q8H, Elisa Franco MD, Last Rate: 100 mL/hr at 05/03/21 0136, 2,000 mg at 05/03/21 0136    HYDROmorphone (DILAUDID) injection 0 5 mg, 0 5 mg, Intravenous, Q6H PRN, Elisa Franco MD, 0 5 mg at 05/02/21 2039    HYDROmorphone (DILAUDID) injection 1 mg, 1 mg, Intravenous, Q4H PRN, Elisa Franco MD, 1 mg at 05/03/21 0525    melatonin tablet 6 mg, 6 mg, Oral, HS, Luis Carlos Dupree PA-C, 6 mg at 05/02/21 2251    metoprolol tartrate (LOPRESSOR) tablet 100 mg, 100 mg, Oral, Q12H [facility], Lina Boyd PA-C, 100 mg at 05/02/21 2029    oxyCODONE (ROXICODONE) IR tablet 5 mg, 5 mg, Oral, Q6H PRN, Kirit Esquivel PA-C, 5 mg at 05/03/21 0143    PARoxetine (PAXIL) tablet 10 mg, 10 mg, Oral, Daily, Lina Boyd PA-C, 10 mg at 05/02/21 1232    pravastatin (PRAVACHOL) tablet 40 mg, 40 mg, Oral, Daily With Miko Cruz PA-C, 40 mg at 05/02/21 1734    Physical exam:  Vitals:    05/03/21 0715   BP: 165/93   Pulse: 62   Resp: 16   Temp: 97 9 °F (36 6 °C)   SpO2: 100%     Right elbow:  · Skin intact  · Mild soft tissue swelling and erythema over the posterior elbow and olecranon  · No palpable fluctuance or collection noted about the olecranon bursa  · Elbow range of motion is flexion 90, extension -10  · Stable to varus and valgus stress  · Neurovascular intact right upper extremity  · 2+ DP pulse    Assessment: 60 y o male with right elbow cellulitis and olecranon bursitis    Plan:  · No evidence of fluid collection over the olecranon bursa    Aspiration not indicated at this time  · Antibiotics per primary team  · Pain control  · PT/OT  · Orthopedics will follow    Rupal Nicholson PA-C

## 2021-05-03 NOTE — PROGRESS NOTES
[facility]  Progress Note - Ngozi Franco 1960, 61 y o  male MRN: [id number]  Unit/Bed#: S -01 Encounter: [id number]  Primary Care Provider: Yaakov Jackson MD   Date and time admitted to hospital: 5/1/2021 11:50 PM    * Olecranon bursitis and surrounding cellulitis of right elbow  Assessment & Plan  · Presented initially on 4/29/21 with swelling of right elbow at which time bursa aspiration was attempted with only bloody drainage noted which was not sent for culture  Now returns with erythema, pain and swelling of right elbow  · Suspect right elbow cellulitis with possible septic bursitis  · X-ray of the right elbow on 4/29/21: "No acute osseous abnormality  Soft tissue swelling noted "  ·  and sed rate 22 on presentation  · Received 1 dose of IV vancomycin and cefazolin in the ED  · Patient afebrile thus far, does not meet sepsis criteria and is without prior h/o MRSA  · Continue IV antibioitcs with cefazolin  · Seen by orthopedics, no drainable collection  · Continue pain control  · Elevate extremity  Other chest pain  Assessment & Plan  · Reports intermittent left sided chest pain  · Likely related to musculoskeletal pain due to overuse of left arm  · ANNAMARIE score: 2  · EKG without ischemic changes  · Troponin < 0 02  · CXR with no acute findings noted  · Continue statin  Not on ASA, on Eliquis  Tobacco abuse  Assessment & Plan  · Reports smoking about 1 ppd and has reportedly been smoking for the past 45 years  · Smoking cessation; nicotine patch offered and patient declined  Essential hypertension  Assessment & Plan  · BP elevated on presentation but has since improved  · Continue to monitor BP  · Continue metoprolol  Gout  Assessment & Plan  · On allopurinol  CAD (coronary artery disease)  Assessment & Plan  · 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  · Follows with cardiology, Dr Diomedes Hernández, at [facility]     · Nuclear stress test in 2018 was a normal study  · Continue statin and BB  Atrial fibrillation [facility])  Assessment & Plan  · S/p prior ablation  · Continue metoprolol  · AC with Eliquis  VTE Pharmacologic Prophylaxis:   Pharmacologic: Apixaban (Eliquis)  Mechanical VTE Prophylaxis in Place: Yes    Patient Centered Rounds: I have performed bedside rounds with nursing staff today  Discussions with Specialists or Other Care Team Provider:     Education and Discussions with Family / Patient:  Patient    Time Spent for Care: 30 minutes  More than 50% of total time spent on counseling and coordination of care as described above  Current Length of Stay: 1 day(s)    Current Patient Status: Inpatient   Certification Statement: The patient will continue to require additional inpatient hospital stay due to IV antibiotics    Discharge Plan:  Next 24-48 hours based on response to IV antibiotics    Code Status: Level 1 - Full Code      Subjective:     Patient feels slightly better today  Still reports a lot of pain right elbow  Swelling has decreased    Objective:     Vitals:   Temp (24hrs), Av 1 °F (36 7 °C), Min:97 9 °F (36 6 °C), Max:98 5 °F (36 9 °C)    Temp:  [97 9 °F (36 6 °C)-98 5 °F (36 9 °C)] 97 9 °F (36 6 °C)  HR:  [62-64] 62  Resp:  [16] 16  BP: (161-187)/(82-93) 165/93  SpO2:  [95 %-100 %] 100 %  Body mass index is 32 01 kg/m²  Input and Output Summary (last 24 hours): Intake/Output Summary (Last 24 hours) at 5/3/2021 1444  Last data filed at 2021 1801  Gross per 24 hour   Intake --   Output 250 ml   Net -250 ml       Physical Exam:     Physical Exam     Gen -Patient comfortable  Neck- Supple  No thyromegaly or lymphadenopathy  Lungs-Clear bilaterally without any wheeze or rales   Heart S1-S2, regular rate and rhythm, no murmurs  Abdomen-soft nontender, no organomegaly  Bowel sounds present  Extremities-no cyanosi,  clubbing or edema  Right elbow erythema warmth and induration    Improving compared to yesterday  Skin- no rash  Neuro-nonfocal         Additional Data:     Labs:    Results from last 7 days   Lab Units 05/03/21  0445  05/02/21  0029   WBC Thousand/uL 5 66   < > 7 96   HEMOGLOBIN g/dL 14 6   < > 14 8   HEMATOCRIT % 44 8   < > 45 2   PLATELETS Thousands/uL 122*   < > 129*   NEUTROS PCT %  --   --  72   LYMPHS PCT %  --   --  17   MONOS PCT %  --   --  10   EOS PCT %  --   --  1    < > = values in this interval not displayed  Results from last 7 days   Lab Units 05/03/21  0445   SODIUM mmol/L 139   POTASSIUM mmol/L 3 7   CHLORIDE mmol/L 104   CO2 mmol/L 28   BUN mg/dL 9   CREATININE mg/dL 0 85   ANION GAP mmol/L 7   CALCIUM mg/dL 9 4   GLUCOSE RANDOM mg/dL 125                           * I Have Reviewed All Lab Data Listed Above  * Additional Pertinent Lab Tests Reviewed: All Labs Within Last 24 Hours Reviewed    Imaging:    Imaging Reports Reviewed Today Include:   Imaging Personally Reviewed by Myself Includes:    Recent Cultures (last 7 days):     Results from last 7 days   Lab Units 05/02/21  0029   BLOOD CULTURE  No Growth at 24 hrs  No Growth at 24 hrs         Last 24 Hours Medication List:   Current Facility-Administered Medications   Medication Dose Route Frequency Provider Last Rate    acetaminophen  650 mg Oral Q6H PRN Fannie Castanon PA-C      allopurinol  100 mg Oral Daily Fannie Castanon PA-C      apixaban  5 mg Oral BID Fannie Castanon PA-C      cefazolin  2,000 mg Intravenous Q8H Elisa Franco MD 2,000 mg (05/03/21 1118)    docusate sodium  100 mg Oral BID Elisa Franco MD      HYDROmorphone  0 5 mg Intravenous Q6H PRN Elisa Franco MD      HYDROmorphone  1 mg Intravenous Q4H PRN Elisa Franco MD      Labetalol HCl  10 mg Intravenous Q4H PRN Elisa Franco MD      lisinopril  10 mg Oral Daily Elisa Franco MD      melatonin  6 mg Oral HS Luis Carlos Dupree PA-C      metoprolol tartrate  100 mg Oral Q12H [id number]  oxyCODONE  5 mg Oral Q6H PRN Mohan Dafne PA-C      PARoxetine  10 mg Oral Daily Mohan STEFANIE Leos-C      polyethylene glycol  17 g Oral Daily PRN Elisa Franco MD      pravastatin  40 mg Oral Daily With Dinner Mohan GIBRAN Leos      senna  1 tablet Oral HS Elisa Franco MD          Today, Patient Was Seen By: Porter Robles MD    ** Please Note: Dictation voice to text software may have been used in the creation of this document   **

## 2021-05-03 NOTE — ASSESSMENT & PLAN NOTE
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

## 2021-05-03 NOTE — PLAN OF CARE
Problem: Potential for Falls  Goal: Patient will remain free of falls  Description: INTERVENTIONS:  - Assess patient frequently for physical needs  -  Identify cognitive and physical deficits and behaviors that affect risk of falls    -  [place] fall precautions as indicated by assessment   - Educate patient/family on patient safety including physical limitations  - Instruct patient to call for assistance with activity based on assessment  - Modify environment to reduce risk of injury  - Consider OT/PT consult to assist with strengthening/mobility  5/2/2021 2038 by Mandy Coleman RN  Outcome: Progressing  5/2/2021 2038 by Mandy Coleman RN  Outcome: Progressing     Problem: PAIN - ADULT  Goal: Verbalizes/displays adequate comfort level or baseline comfort level  Description: Interventions:  - Encourage patient to monitor pain and request assistance  - Assess pain using appropriate pain scale  - Administer analgesics based on type and severity of pain and evaluate response  - Implement non-pharmacological measures as appropriate and evaluate response  - Consider cultural and social influences on pain and pain management  - Notify physician/advanced practitioner if interventions unsuccessful or patient reports new pain  Outcome: Progressing     Problem: INFECTION - ADULT  Goal: Absence or prevention of progression during hospitalization  Description: INTERVENTIONS:  - Assess and monitor for signs and symptoms of infection  - Monitor lab/diagnostic results  - Monitor all insertion sites, i e  indwelling lines, tubes, and drains  - Monitor endotracheal if appropriate and nasal secretions for changes in amount and color  - [place] appropriate cooling/warming therapies per order  - Administer medications as ordered  - Instruct and encourage patient and family to use good hand hygiene technique  - Identify and instruct in appropriate isolation precautions for identified infection/condition  Outcome: Progressing

## 2021-05-04 ENCOUNTER — APPOINTMENT (INPATIENT)
Dept: ULTRASOUND IMAGING | Facility: HOSPITAL | Age: 61
DRG: 558 | End: 2021-05-04
Payer: COMMERCIAL

## 2021-05-04 VITALS
SYSTOLIC BLOOD PRESSURE: 164 MMHG | TEMPERATURE: 97.7 F | HEIGHT: 67 IN | OXYGEN SATURATION: 95 % | BODY MASS INDEX: 32.08 KG/M2 | RESPIRATION RATE: 18 BRPM | HEART RATE: 62 BPM | WEIGHT: 204.37 LBS | DIASTOLIC BLOOD PRESSURE: 85 MMHG

## 2021-05-04 PROBLEM — R07.89 OTHER CHEST PAIN: Status: RESOLVED | Noted: 2021-05-02 | Resolved: 2021-05-04

## 2021-05-04 LAB
ANION GAP SERPL CALCULATED.3IONS-SCNC: 6 MMOL/L (ref 4–13)
BUN SERPL-MCNC: 11 MG/DL (ref 5–25)
CALCIUM SERPL-MCNC: 9.9 MG/DL (ref 8.3–10.1)
CHLORIDE SERPL-SCNC: 104 MMOL/L (ref 100–108)
CO2 SERPL-SCNC: 30 MMOL/L (ref 21–32)
CREAT SERPL-MCNC: 0.96 MG/DL (ref 0.6–1.3)
ERYTHROCYTE [DISTWIDTH] IN BLOOD BY AUTOMATED COUNT: 13.2 % (ref 11.6–15.1)
GFR SERPL CREATININE-BSD FRML MDRD: 86 ML/MIN/1.73SQ M
GLUCOSE SERPL-MCNC: 122 MG/DL (ref 65–140)
HCT VFR BLD AUTO: 43.2 % (ref 36.5–49.3)
HGB BLD-MCNC: 14.3 G/DL (ref 12–17)
MCH RBC QN AUTO: 30.4 PG (ref 26.8–34.3)
MCHC RBC AUTO-ENTMCNC: 33.1 G/DL (ref 31.4–37.4)
MCV RBC AUTO: 92 FL (ref 82–98)
PLATELET # BLD AUTO: 143 THOUSANDS/UL (ref 149–390)
PMV BLD AUTO: 9.7 FL (ref 8.9–12.7)
POTASSIUM SERPL-SCNC: 3.6 MMOL/L (ref 3.5–5.3)
RBC # BLD AUTO: 4.7 MILLION/UL (ref 3.88–5.62)
SODIUM SERPL-SCNC: 140 MMOL/L (ref 136–145)
WBC # BLD AUTO: 4.99 THOUSAND/UL (ref 4.31–10.16)

## 2021-05-04 PROCEDURE — 85027 COMPLETE CBC AUTOMATED: CPT | Performed by: INTERNAL MEDICINE

## 2021-05-04 PROCEDURE — 99231 SBSQ HOSP IP/OBS SF/LOW 25: CPT | Performed by: PHYSICIAN ASSISTANT

## 2021-05-04 PROCEDURE — 76882 US LMTD JT/FCL EVL NVASC XTR: CPT

## 2021-05-04 PROCEDURE — 99239 HOSP IP/OBS DSCHRG MGMT >30: CPT | Performed by: PHYSICIAN ASSISTANT

## 2021-05-04 PROCEDURE — 80048 BASIC METABOLIC PNL TOTAL CA: CPT | Performed by: INTERNAL MEDICINE

## 2021-05-04 RX ORDER — ACETAMINOPHEN 325 MG/1
650 TABLET ORAL EVERY 6 HOURS PRN
Status: DISCONTINUED | OUTPATIENT
Start: 2021-05-04 | End: 2021-05-04 | Stop reason: HOSPADM

## 2021-05-04 RX ORDER — CEPHALEXIN 500 MG/1
500 CAPSULE ORAL EVERY 6 HOURS SCHEDULED
Qty: 20 CAPSULE | Refills: 0 | Status: SHIPPED | OUTPATIENT
Start: 2021-05-04 | End: 2021-05-09

## 2021-05-04 RX ORDER — LISINOPRIL 40 MG/1
40 TABLET ORAL DAILY
COMMUNITY

## 2021-05-04 RX ORDER — OXYCODONE HYDROCHLORIDE 5 MG/1
5 TABLET ORAL EVERY 6 HOURS PRN
Qty: 15 TABLET | Refills: 0 | OUTPATIENT
Start: 2021-05-04 | End: 2021-12-01

## 2021-05-04 RX ORDER — KETOROLAC TROMETHAMINE 30 MG/ML
15 INJECTION, SOLUTION INTRAMUSCULAR; INTRAVENOUS ONCE
Status: DISCONTINUED | OUTPATIENT
Start: 2021-05-04 | End: 2021-05-04 | Stop reason: HOSPADM

## 2021-05-04 RX ADMIN — PAROXETINE HYDROCHLORIDE 10 MG: 20 TABLET, FILM COATED ORAL at 09:19

## 2021-05-04 RX ADMIN — CEFAZOLIN SODIUM 2000 MG: 2 SOLUTION INTRAVENOUS at 02:32

## 2021-05-04 RX ADMIN — APIXABAN 5 MG: 5 TABLET, FILM COATED ORAL at 09:19

## 2021-05-04 RX ADMIN — LISINOPRIL 10 MG: 10 TABLET ORAL at 09:19

## 2021-05-04 RX ADMIN — METOPROLOL TARTRATE 100 MG: 100 TABLET, FILM COATED ORAL at 09:19

## 2021-05-04 RX ADMIN — OXYCODONE HYDROCHLORIDE 5 MG: 5 TABLET ORAL at 02:41

## 2021-05-04 RX ADMIN — DOCUSATE SODIUM 100 MG: 100 CAPSULE, LIQUID FILLED ORAL at 09:19

## 2021-05-04 RX ADMIN — ALLOPURINOL 100 MG: 100 TABLET ORAL at 09:19

## 2021-05-04 NOTE — ASSESSMENT & PLAN NOTE
· Home dose of lisinopril is 40 mg daily but was receiving 10 mg daily - resume prior on discharge  · Continue metoprolol

## 2021-05-04 NOTE — ASSESSMENT & PLAN NOTE
· Reports intermittent left sided chest pain  · Likely related to musculoskeletal pain due to overuse of left arm    · Resolved  · troponins negative

## 2021-05-04 NOTE — DISCHARGE INSTR - AVS FIRST PAGE
Dear Griffin Corcoran,     It was our pleasure to care for you here at [facility]  It is our hope that we were always able to exceed the expected standards for your care during your stay  You were hospitalized due to cellulitis and bursitits  You were cared for on the [facility] [address] 4th floor by Sherren Kitchens, PA-C under the service of Gwyn García MD with the [facility] who covers for your primary care physician (PCP), Stacey Varela MD, while you were hospitalized  If you have any questions or concerns related to this hospitalization, you may contact us at [phone]  For follow up as well as any medication refills, we recommend that you follow up with your primary care physician  A registered nurse will reach out to you by phone within a few days after your discharge to answer any additional questions that you may have after going home  However, at this time we provide for you here, the most important instructions / recommendations at discharge:     · Notable Medication Adjustments -   · Take Keflex 500 mg 4 times a day for an additional 5 days  This is an antibiotic for the infection of your skin  · Take oxycodone as needed  Do not drive or operate machinery while taking this medication  · You can take Tylenol for pain as well  Do not take consistent Motrin or ibuprofen given that you take Eliquis  · Testing Required after Discharge -   · None  · Important follow up information -   · Follow-up with your PCP  You may eventually need to follow-up with orthopedics as well  · Other Instructions -   · I provided to work note  Please see your PCP prior to returning to work  · Please review this entire after visit summary as additional general instructions including medication list, appointments, activity, diet, any pertinent wound care, and other additional recommendations from your care team that may be provided for you      Sincerely,   Cindy Gudino GIBRAN Gramajo

## 2021-05-04 NOTE — PROGRESS NOTES
Orthopedics         Subjective:  Patient seen and evaluated this morning  He notes no fevers or chills overnight  Still notes pain and restricted range of motion the right elbow  Pain is over the posterior elbow over like a non and triceps  He denies any numbness or tingling      Labs:  0   Lab Value Date/Time    HCT 43 2 05/04/2021 0446    HCT 44 8 05/03/2021 0445    HCT 43 7 05/02/2021 0647    HGB 14 3 05/04/2021 0446    HGB 14 6 05/03/2021 0445    HGB 14 1 05/02/2021 0647    WBC 4 99 05/04/2021 0446    WBC 5 66 05/03/2021 0445    WBC 6 87 05/02/2021 0647    ESR 22 (H) 05/02/2021 0029     0 (H) 05/02/2021 0029       Meds:    Current Facility-Administered Medications:     acetaminophen (TYLENOL) tablet 650 mg, 650 mg, Oral, Q6H PRN, [facility] GIBRAN Gramajo    allopurinol (ZYLOPRIM) tablet 100 mg, 100 mg, Oral, Daily, Lina Boyd PA-C, 100 mg at 05/04/21 0919    apixaban (ELIQUIS) tablet 5 mg, 5 mg, Oral, BID, Lina Boyd PA-C, 5 mg at 05/04/21 0919    ceFAZolin (ANCEF) IVPB (premix in dextrose) 2,000 mg 50 mL, 2,000 mg, Intravenous, Q8H, Elias Franco MD, Last Rate: 100 mL/hr at 05/04/21 0232, 2,000 mg at 05/04/21 0232    docusate sodium (COLACE) capsule 100 mg, 100 mg, Oral, BID, Elisa Franco MD, 100 mg at 05/04/21 0919    HYDROmorphone (DILAUDID) injection 0 5 mg, 0 5 mg, Intravenous, Q6H PRN, Elisa Franco MD, 0 5 mg at 05/03/21 2136    HYDROmorphone (DILAUDID) injection 1 mg, 1 mg, Intravenous, Q4H PRN, Elisa Franco MD, 1 mg at 05/03/21 0525    ketorolac (TORADOL) injection 15 mg, 15 mg, Intravenous, Once, Frances Gramajo PA-C    Labetalol HCl (NORMODYNE) injection 10 mg, 10 mg, Intravenous, Q4H PRN, Elisa Franco MD    lisinopril (ZESTRIL) tablet 10 mg, 10 mg, Oral, Daily, Elisa Franco MD, 10 mg at 05/04/21 0919    melatonin tablet 6 mg, 6 mg, Oral, HS, Luis Carlos Dupree PA-C, 6 mg at 05/03/21 2136    metoprolol tartrate (LOPRESSOR) tablet 100 mg, 100 mg, Oral, Q12H [address], Lina Boyd PA-C, 100 mg at 05/04/21 0919    oxyCODONE (ROXICODONE) IR tablet 5 mg, 5 mg, Oral, Q6H PRN, Akhil Quispe PA-C, 5 mg at 05/04/21 0241    PARoxetine (PAXIL) tablet 10 mg, 10 mg, Oral, Daily, Lina Boyd PA-C, 10 mg at 05/04/21 0919    polyethylene glycol (MIRALAX) packet 17 g, 17 g, Oral, Daily PRN, Elisa Franco MD, 17 g at 05/03/21 1353    pravastatin (PRAVACHOL) tablet 40 mg, 40 mg, Oral, Daily With Rosio Dotson PA-C, 40 mg at 05/03/21 1531    senna (SENOKOT) tablet 8 6 mg, 1 tablet, Oral, HS, Elisa Franco MD, 8 6 mg at 05/03/21 2136    Physical exam:  Vitals:    05/04/21 0911   BP: 164/85   Pulse: 62   Resp:    Temp: 97 7 °F (36 5 °C)   SpO2:      Right elbow:  · Skin intact  · Mild soft tissue swelling and erythema over the posterior elbow and posterior medial distal arm and olecranon without any palpable bursal fluid collection or other palpable subcutaneous fluid collection  · Erythema appears improved as compared to prior skin markings  · Elbow range of motion is flexion 90, extension -20   Limited by pain  · Stable to varus and valgus stress  · 4/5 strength with elbow extension, no palpable defect in the triceps tendon there is notable tenderness palpation over the triceps tendon and olecranon  · Neurovascular intact right upper extremity  · 2+ DP pulse    Assessment: 60 y o male with right elbow cellulitis and olecranon bursitis    Plan:  · There is no identifiable subcutaneous fluid collection over the olecranon bursa that is able to be aspirated at this time  · Cellulitis appears to be improving, patient does have pain with resisted elbow extension and tenderness over the triceps and olecranon  · Antibiotics per primary team  · Pain control her primary pain  · PT/OT  · May evaluate the elbow and posterior arm with ultrasound to rule out any discrete fluid collection given the persistent pain and swelling, still low suspicion for this given mild erythema and no systemic symptoms  · If ultrasound does not reveal any evidence of fluid collection, patient will be clear for discharge from orthopedic standpoint    Gisselle Martinez PA-C

## 2021-05-04 NOTE — DISCHARGE SUMMARY
[facility]  Discharge- Radha Lugo 1960, 61 y o  male MRN: [id number]  Unit/Bed#: S -01 Encounter: [id number]  Primary Care Provider: Albert Lucero MD   Date and time admitted to hospital: 5/1/2021 11:50 PM    * Olecranon bursitis and surrounding cellulitis of right elbow  Assessment & Plan  · Presented initially on 4/29/21 with swelling of right elbow at which time bursa aspiration was attempted with only bloody drainage noted  · Returned 5/1 with erythema, pain and swelling of right elbow  · Suspect right elbow cellulitis with olecranon bursitis  · X-ray of the right elbow on 4/29/21: "No acute osseous abnormality  Soft tissue swelling noted "  ·  and sed rate 22 on presentation  · Received 1 dose of IV vancomycin and cefazolin in the ED  · Patient afebrile thus far, does not meet sepsis criteria and is without prior h/o MRSA  · Received IV Ancef during admission will be discharged with p o  Keflex to complete course  · Seen by orthopedics, no drainable collection  · Ultrasound performed with no evidence of fluid collection  · Continue pain control and elevation of extremity upon discharge    Atrial fibrillation 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  · Follows with cardiology, Dr Lavinia Miranda, at [facility]  · Nuclear stress test in April 2018 was a normal study  · Continue statin and BB  Essential hypertension  Assessment & Plan  · Home dose of lisinopril is 40 mg daily but was receiving 10 mg daily - resume prior on discharge  · Continue metoprolol  Other chest pain  Assessment & Plan  · Reports intermittent left sided chest pain  · Likely related to musculoskeletal pain due to overuse of left arm    · Resolved  · troponins negative    Tobacco abuse  Assessment & Plan  · Reports smoking about 1 ppd and has reportedly been smoking for the past 45 years  · Smoking cessation; nicotine patch offered and patient declined  Resolved Problems  Date Reviewed: 5/4/2021    None        Discharging Physician / Practitioner: Itz Rod PA-C  PCP: Naheed Khan MD  Admission Date:   Admission Orders (From admission, onward)     Ordered        05/02/21 0349  Inpatient Admission  Once                   Discharge Date: 05/04/21    Consultations During Hospital Stay:  · orthopedics    Procedures Performed:   · none    Significant Findings / Test Results:   · Ultrasound of arm:  Subcutaneous edema no drainable fluid collection  · Chest x-ray:  No acute cardiopulmonary disease    Incidental Findings:   · None     Test Results Pending at Discharge (will require follow up): · Final blood cultures     Outpatient Tests Requested:  · Follow-up with PCP    Complications:  None    Reason for Admission:  Right arm pain    Hospital Course:   Kelsey Sosa is a 61 y o  male patient who originally presented to the hospital on 5/1/2021 due to right arm pain  Patient has past medical history of coronary disease status post stenting, atrial fibrillation status post ablation and pulmonary and pacemaker implantation, hypertension, gout tobacco abuse presented to emergency department with right elbow erythema, edema and pain  The patient was seen in the emergency department on the evening of 4/29/2021 for swelling of right elbow without had started earlier that day and attempted aspiration of the bursa was unsuccessful  The patient was noted to have increased pain, erythema, edema and fever of 99 9 and chills and therefore came back to emergency department for further evaluation  He was felt to have right superficial cellulitis and olecranon bursitis  He was seen in consultation by Orthopedics  There was no drainable fluid collection able to be identified or performed and this was confirmed via ultrasound    Patient was treated with IV antibiotics with improvement in surrounding cellulitis  He will be discharged with 5 days of p o  Keflex to complete total course  He did have chest pain which is felt to be secondary to overuse and was felt to be musculoskeletal in nature and subsequently resolved  His troponins were negative EKG was nonischemic  Please see above list of diagnoses and related plan for additional information  Condition at Discharge: stable    Discharge Day Visit / Exam:   Subjective:  Feeling well  Erythema significantly improved  Swelling improved  Still with pain  Vitals: Blood Pressure: 164/85 (05/04/21 0911)  Pulse: 62 (05/04/21 0911)  Temperature: 97 7 °F (36 5 °C) (05/04/21 0911)  Temp Source: Oral (05/04/21 0911)  Respirations: 18 (05/03/21 2119)  Height: 5' 7" (170 2 cm) (05/01/21 2355)  Weight - Scale: 92 7 kg (204 lb 5 9 oz) (05/01/21 2355)  SpO2: 95 % (05/03/21 2119)  Exam:   Physical Exam  Vitals signs and nursing note reviewed  Constitutional:       General: He is not in acute distress  Appearance: Normal appearance  He is not diaphoretic  HENT:      Head: Normocephalic and atraumatic  Mouth/Throat:      Mouth: Mucous membranes are moist    Cardiovascular:      Rate and Rhythm: Normal rate and regular rhythm  Pulmonary:      Effort: Pulmonary effort is normal       Breath sounds: Normal breath sounds  No stridor  No wheezing, rhonchi or rales  Abdominal:      General: Bowel sounds are normal       Palpations: Abdomen is soft  There is no mass  Tenderness: There is no abdominal tenderness  There is no guarding  Musculoskeletal:      Right lower leg: No edema  Left lower leg: No edema  Comments: Decreased range of motion of the right elbow likely related to pain   Skin:     General: Skin is warm and dry  Comments: Right antecubital fossa with mild erythema  No significant warmth  Edema noted over right elbow area    Erythema has receded and is within the markings of previously drawn lines  Neurological:      Mental Status: He is alert  Psychiatric:         Mood and Affect: Mood normal          Behavior: Behavior normal           Discussion with Family: Patient declined call to   Discharge instructions/Information to patient and family:   See after visit summary for information provided to patient and family  Provisions for Follow-Up Care:  See after visit summary for information related to follow-up care and any pertinent home health orders  Disposition:   Home    Planned Readmission: no     Discharge Statement:  I spent 45 minutes discharging the patient  This time was spent on the day of discharge  I had direct contact with the patient on the day of discharge  Greater than 50% of the total time was spent examining patient, answering all patient questions, arranging and discussing plan of care with patient as well as directly providing post-discharge instructions  Additional time then spent on discharge activities  Discharge Medications:  See after visit summary for reconciled discharge medications provided to patient and/or family        **Please Note: This note may have been constructed using a voice recognition system**

## 2021-05-04 NOTE — PLAN OF CARE
Problem: Potential for Falls  Goal: Patient will remain free of falls  Description: INTERVENTIONS:  - Assess patient frequently for physical needs  -  Identify cognitive and physical deficits and behaviors that affect risk of falls    -  [place] fall precautions as indicated by assessment   - Educate patient/family on patient safety including physical limitations  - Instruct patient to call for assistance with activity based on assessment  - Modify environment to reduce risk of injury  - Consider OT/PT consult to assist with strengthening/mobility  Outcome: Progressing     Problem: PAIN - ADULT  Goal: Verbalizes/displays adequate comfort level or baseline comfort level  Description: Interventions:  - Encourage patient to monitor pain and request assistance  - Assess pain using appropriate pain scale  - Administer analgesics based on type and severity of pain and evaluate response  - Implement non-pharmacological measures as appropriate and evaluate response  - Consider cultural and social influences on pain and pain management  - Notify physician/advanced practitioner if interventions unsuccessful or patient reports new pain  Outcome: Progressing     Problem: INFECTION - ADULT  Goal: Absence or prevention of progression during hospitalization  Description: INTERVENTIONS:  - Assess and monitor for signs and symptoms of infection  - Monitor lab/diagnostic results  - Monitor all insertion sites, i e  indwelling lines, tubes, and drains  - Monitor endotracheal if appropriate and nasal secretions for changes in amount and color  - [place] appropriate cooling/warming therapies per order  - Administer medications as ordered  - Instruct and encourage patient and family to use good hand hygiene technique  - Identify and instruct in appropriate isolation precautions for identified infection/condition  Outcome: Progressing  Goal: Absence of fever/infection during neutropenic period  Description: INTERVENTIONS:  - Monitor WBC    Outcome: Progressing     Problem: SAFETY ADULT  Goal: Patient will remain free of falls  Description: INTERVENTIONS:  - Assess patient frequently for physical needs  -  Identify cognitive and physical deficits and behaviors that affect risk of falls    -  [place] fall precautions as indicated by assessment   - Educate patient/family on patient safety including physical limitations  - Instruct patient to call for assistance with activity based on assessment  - Modify environment to reduce risk of injury  - Consider OT/PT consult to assist with strengthening/mobility  Outcome: Progressing  Goal: Maintain or return to baseline ADL function  Description: INTERVENTIONS:  -  Assess patient's ability to carry out ADLs; assess patient's baseline for ADL function and identify physical deficits which impact ability to perform ADLs (bathing, care of mouth/teeth, toileting, grooming, dressing, etc )  - Assess/evaluate cause of self-care deficits   - Assess range of motion  - Assess patient's mobility; develop plan if impaired  - Assess patient's need for assistive devices and provide as appropriate  - Encourage maximum independence but intervene and supervise when necessary  - Involve family in performance of ADLs  - Assess for home care needs following discharge   - Consider OT consult to assist with ADL evaluation and planning for discharge  - Provide patient education as appropriate  Outcome: Progressing  Goal: Maintain or return mobility status to optimal level  Description: INTERVENTIONS:  - Assess patient's baseline mobility status (ambulation, transfers, stairs, etc )    - Identify cognitive and physical deficits and behaviors that affect mobility  - Identify mobility aids required to assist with transfers and/or ambulation (gait belt, sit-to-stand, lift, walker, cane, etc )  - [place] fall precautions as indicated by assessment  - Record patient progress and toleration of activity level on Mobility SBAR; progress patient to next Phase/Stage  - Instruct patient to call for assistance with activity based on assessment  - Consider rehabilitation consult to assist with strengthening/weightbearing, etc   Outcome: Progressing     Problem: DISCHARGE PLANNING  Goal: Discharge to home or other facility with appropriate resources  Description: INTERVENTIONS:  - Identify barriers to discharge w/patient and caregiver  - Arrange for needed discharge resources and transportation as appropriate  - Identify discharge learning needs (meds, wound care, etc )  - Arrange for interpretive services to assist at discharge as needed  - Refer to Case Management Department for coordinating discharge planning if the patient needs post-hospital services based on physician/advanced practitioner order or complex needs related to functional status, cognitive ability, or social support system  Outcome: Progressing     Problem: SKIN/TISSUE INTEGRITY - ADULT  Goal: Skin integrity remains intact  Description: INTERVENTIONS  - Identify patients at risk for skin breakdown  - Assess and monitor skin integrity  - Assess and monitor nutrition and hydration status  - Monitor labs (i e  albumin)  - Assess for incontinence   - Turn and reposition patient  - Assist with mobility/ambulation  - Relieve pressure over bony prominences  - Avoid friction and shearing  - Provide appropriate hygiene as needed including keeping skin clean and dry  - Evaluate need for skin moisturizer/barrier cream  - Collaborate with interdisciplinary team (i e  Nutrition, Rehabilitation, etc )   - Patient/family teaching  Outcome: Progressing  Goal: Incision(s), wounds(s) or drain site(s) healing without S/S of infection  Description: INTERVENTIONS  - Assess and document risk factors for skin impairment   - Assess and document dressing, incision, wound bed, drain sites and surrounding tissue  - Consider nutrition services referral as needed  - Oral mucous membranes remain intact  - Provide patient/ family education  Outcome: Progressing  Goal: Oral mucous membranes remain intact  Description: INTERVENTIONS  - Assess oral mucosa and hygiene practices  - Implement preventative oral hygiene regimen  - Implement oral medicated treatments as ordered  - Initiate Nutrition services referral as needed  Outcome: Progressing

## 2021-05-04 NOTE — ASSESSMENT & PLAN NOTE
· Presented initially on 4/29/21 with swelling of right elbow at which time bursa aspiration was attempted with only bloody drainage noted  · Returned 5/1 with erythema, pain and swelling of right elbow  · Suspect right elbow cellulitis with olecranon bursitis  · X-ray of the right elbow on 4/29/21: "No acute osseous abnormality  Soft tissue swelling noted "  ·  and sed rate 22 on presentation  · Received 1 dose of IV vancomycin and cefazolin in the ED  · Patient afebrile thus far, does not meet sepsis criteria and is without prior h/o MRSA  · Received IV Ancef during admission will be discharged with p o   Keflex to complete course  · Seen by orthopedics, no drainable collection  · Ultrasound performed with no evidence of fluid collection  · Continue pain control and elevation of extremity upon discharge

## 2021-05-04 NOTE — ASSESSMENT & PLAN NOTE
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

## 2021-05-05 NOTE — UTILIZATION REVIEW
Notification of Discharge   This is a Notification of Discharge from our facility [address]  Please be advised that this patient has been discharge from our facility  Below you will find the admission and discharge date and time including the patients disposition  UTILIZATION REVIEW CONTACT:  Nazanin Oh  Utilization   Network Utilization Review Department  Phone: [phone] x carefully listen to the prompts  All voicemails are confidential   Email: [email or web address]     PHYSICIAN ADVISORY SERVICES:  FOR [id number] REVIEW - MEDICAL NECESSITY DENIAL  Phone: [phone]  Fax: [phone]  Email: [email or web address]     PRESENTATION DATE: 5/1/2021 11:50 PM  OBERVATION ADMISSION DATE:   INPATIENT ADMISSION DATE: 5/2/21 0349   DISCHARGE DATE: 5/4/2021 12:57 PM  DISPOSITION: Home/Self Care Home/Self Care      IMPORTANT INFORMATION:  Send all requests for admission clinical reviews, approved or denied determinations and any other requests to dedicated fax number below belonging to the campus where the patient is receiving treatment   List of dedicated fax numbers:  [address] DENIALS (Administrative/Medical Necessity) [phone]   [address] [place] [address] (Maternity/NICU/Pediatrics) [phone]   Lorilee Baptise [phone]   Areta Sell [phone]   Harleen Pitch [phone]   Kavya Arriaga [facility] [address] [facility] [phone]   [facility]  [phone]   [address] [facility] [address] [facility]  [address] [facility] [address] [facility] [phone]

## 2021-05-07 LAB
BACTERIA BLD CULT: NORMAL
BACTERIA BLD CULT: NORMAL

## 2021-11-29 ENCOUNTER — APPOINTMENT (EMERGENCY)
Dept: CT IMAGING | Facility: HOSPITAL | Age: 61
End: 2021-11-29
Payer: COMMERCIAL

## 2021-11-29 ENCOUNTER — HOSPITAL ENCOUNTER (OUTPATIENT)
Facility: HOSPITAL | Age: 61
Setting detail: OBSERVATION
Discharge: HOME | End: 2021-12-01
Attending: EMERGENCY MEDICINE | Admitting: INTERNAL MEDICINE
Payer: COMMERCIAL

## 2021-11-29 DIAGNOSIS — N45.1 EPIDIDYMITIS: ICD-10-CM

## 2021-11-29 DIAGNOSIS — N50.811 TESTICULAR PAIN, RIGHT: ICD-10-CM

## 2021-11-29 DIAGNOSIS — R91.1 INCIDENTAL LUNG NODULE, GREATER THAN OR EQUAL TO 8MM: ICD-10-CM

## 2021-11-29 DIAGNOSIS — N45.3 ORCHITIS AND EPIDIDYMITIS: Primary | ICD-10-CM

## 2021-11-29 LAB
BASOPHILS # BLD AUTO: 0.04 THOUSANDS/ΜL (ref 0–0.1)
BASOPHILS NFR BLD AUTO: 0 % (ref 0–1)
EOSINOPHIL # BLD AUTO: 0 THOUSAND/ΜL (ref 0–0.61)
EOSINOPHIL NFR BLD AUTO: 0 % (ref 0–6)
ERYTHROCYTE [DISTWIDTH] IN BLOOD BY AUTOMATED COUNT: 12.7 % (ref 11.6–15.1)
HCT VFR BLD AUTO: 52.2 % (ref 36.5–49.3)
HGB BLD-MCNC: 17.2 G/DL (ref 12–17)
IMM GRANULOCYTES # BLD AUTO: 0.06 THOUSAND/UL (ref 0–0.2)
IMM GRANULOCYTES NFR BLD AUTO: 1 % (ref 0–2)
LYMPHOCYTES # BLD AUTO: 1.67 THOUSANDS/ΜL (ref 0.6–4.47)
LYMPHOCYTES NFR BLD AUTO: 17 % (ref 14–44)
MCH RBC QN AUTO: 30.4 PG (ref 26.8–34.3)
MCHC RBC AUTO-ENTMCNC: 33 G/DL (ref 31.4–37.4)
MCV RBC AUTO: 92 FL (ref 82–98)
MONOCYTES # BLD AUTO: 0.6 THOUSAND/ΜL (ref 0.17–1.22)
MONOCYTES NFR BLD AUTO: 6 % (ref 4–12)
NEUTROPHILS # BLD AUTO: 7.37 THOUSANDS/ΜL (ref 1.85–7.62)
NEUTS SEG NFR BLD AUTO: 76 % (ref 43–75)
NRBC BLD AUTO-RTO: 0 /100 WBCS
PLATELET # BLD AUTO: 168 THOUSANDS/UL (ref 149–390)
PMV BLD AUTO: 9.8 FL (ref 8.9–12.7)
RBC # BLD AUTO: 5.66 MILLION/UL (ref 3.88–5.62)
WBC # BLD AUTO: 9.74 THOUSAND/UL (ref 4.31–10.16)

## 2021-11-29 PROCEDURE — 83605 ASSAY OF LACTIC ACID: CPT | Performed by: EMERGENCY MEDICINE

## 2021-11-29 PROCEDURE — 93005 ELECTROCARDIOGRAM TRACING: CPT

## 2021-11-29 PROCEDURE — 71275 CT ANGIOGRAPHY CHEST: CPT

## 2021-11-29 PROCEDURE — 87040 BLOOD CULTURE FOR BACTERIA: CPT | Performed by: EMERGENCY MEDICINE

## 2021-11-29 PROCEDURE — 83880 ASSAY OF NATRIURETIC PEPTIDE: CPT

## 2021-11-29 PROCEDURE — 84145 PROCALCITONIN (PCT): CPT | Performed by: EMERGENCY MEDICINE

## 2021-11-29 PROCEDURE — 96374 THER/PROPH/DIAG INJ IV PUSH: CPT

## 2021-11-29 PROCEDURE — 84484 ASSAY OF TROPONIN QUANT: CPT

## 2021-11-29 PROCEDURE — 80053 COMPREHEN METABOLIC PANEL: CPT

## 2021-11-29 PROCEDURE — 99285 EMERGENCY DEPT VISIT HI MDM: CPT

## 2021-11-29 PROCEDURE — G1004 CDSM NDSC: HCPCS

## 2021-11-29 PROCEDURE — 85610 PROTHROMBIN TIME: CPT

## 2021-11-29 PROCEDURE — 85025 COMPLETE CBC W/AUTO DIFF WBC: CPT

## 2021-11-29 PROCEDURE — 74174 CTA ABD&PLVS W/CONTRAST: CPT

## 2021-11-29 PROCEDURE — 0241U HB NFCT DS VIR RESP RNA 4 TRGT: CPT

## 2021-11-29 PROCEDURE — 81001 URINALYSIS AUTO W/SCOPE: CPT

## 2021-11-29 PROCEDURE — 83690 ASSAY OF LIPASE: CPT

## 2021-11-29 PROCEDURE — 96361 HYDRATE IV INFUSION ADD-ON: CPT

## 2021-11-29 PROCEDURE — 85730 THROMBOPLASTIN TIME PARTIAL: CPT

## 2021-11-29 PROCEDURE — 36415 COLL VENOUS BLD VENIPUNCTURE: CPT

## 2021-11-29 RX ORDER — ONDANSETRON 2 MG/ML
4 INJECTION INTRAMUSCULAR; INTRAVENOUS ONCE
Status: COMPLETED | OUTPATIENT
Start: 2021-11-29 | End: 2021-11-29

## 2021-11-29 RX ADMIN — SODIUM CHLORIDE 1000 ML: 0.9 INJECTION, SOLUTION INTRAVENOUS at 23:43

## 2021-11-29 RX ADMIN — ONDANSETRON 4 MG: 2 INJECTION INTRAMUSCULAR; INTRAVENOUS at 23:43

## 2021-11-30 ENCOUNTER — APPOINTMENT (EMERGENCY)
Dept: ULTRASOUND IMAGING | Facility: HOSPITAL | Age: 61
End: 2021-11-30
Payer: COMMERCIAL

## 2021-11-30 PROBLEM — R91.8 PULMONARY NODULES: Status: ACTIVE | Noted: 2021-11-30

## 2021-11-30 PROBLEM — N45.3 ORCHITIS AND EPIDIDYMITIS: Status: ACTIVE | Noted: 2021-11-30

## 2021-11-30 PROBLEM — N45.1 EPIDIDYMITIS: Status: ACTIVE | Noted: 2021-11-30

## 2021-11-30 PROBLEM — I16.0 HYPERTENSIVE URGENCY: Status: ACTIVE | Noted: 2021-11-30

## 2021-11-30 LAB
2HR DELTA HS TROPONIN: 0 NG/L
4HR DELTA HS TROPONIN: 1 NG/L
ALBUMIN SERPL BCP-MCNC: 3.8 G/DL (ref 3.5–5)
ALP SERPL-CCNC: 117 U/L (ref 46–116)
ALT SERPL W P-5'-P-CCNC: 43 U/L (ref 12–78)
ANION GAP SERPL CALCULATED.3IONS-SCNC: 9 MMOL/L (ref 4–13)
APTT PPP: 36 SECONDS (ref 23–37)
AST SERPL W P-5'-P-CCNC: 32 U/L (ref 5–45)
ATRIAL RATE: 68 BPM
BACTERIA UR QL AUTO: NORMAL /HPF
BILIRUB SERPL-MCNC: 1.28 MG/DL (ref 0.2–1)
BILIRUB UR QL STRIP: NEGATIVE
BUN SERPL-MCNC: 18 MG/DL (ref 5–25)
CALCIUM SERPL-MCNC: 9.2 MG/DL (ref 8.3–10.1)
CARDIAC TROPONIN I PNL SERPL HS: 5 NG/L
CARDIAC TROPONIN I PNL SERPL HS: 5 NG/L
CARDIAC TROPONIN I PNL SERPL HS: 6 NG/L
CHLORIDE SERPL-SCNC: 99 MMOL/L (ref 100–108)
CK SERPL-CCNC: 102 U/L (ref 39–308)
CLARITY UR: CLEAR
CO2 SERPL-SCNC: 28 MMOL/L (ref 21–32)
COLOR UR: YELLOW
CREAT SERPL-MCNC: 1 MG/DL (ref 0.6–1.3)
FLUAV RNA RESP QL NAA+PROBE: NEGATIVE
FLUBV RNA RESP QL NAA+PROBE: NEGATIVE
GFR SERPL CREATININE-BSD FRML MDRD: 81 ML/MIN/1.73SQ M
GLUCOSE SERPL-MCNC: 145 MG/DL (ref 65–140)
GLUCOSE UR STRIP-MCNC: NEGATIVE MG/DL
HGB UR QL STRIP.AUTO: ABNORMAL
INR PPP: 1.17 (ref 0.84–1.19)
KETONES UR STRIP-MCNC: NEGATIVE MG/DL
LACTATE SERPL-SCNC: 0.9 MMOL/L (ref 0.5–2)
LACTATE SERPL-SCNC: 2.1 MMOL/L (ref 0.5–2)
LACTATE SERPL-SCNC: 2.4 MMOL/L (ref 0.5–2)
LACTATE SERPL-SCNC: 2.7 MMOL/L (ref 0.5–2)
LEUKOCYTE ESTERASE UR QL STRIP: ABNORMAL
LIPASE SERPL-CCNC: 336 U/L (ref 73–393)
NITRITE UR QL STRIP: NEGATIVE
NON-SQ EPI CELLS URNS QL MICRO: NORMAL /HPF
NT-PROBNP SERPL-MCNC: 624 PG/ML
P AXIS: 62 DEGREES
PH UR STRIP.AUTO: 6 [PH]
POTASSIUM SERPL-SCNC: 3.9 MMOL/L (ref 3.5–5.3)
PR INTERVAL: 146 MS
PROCALCITONIN SERPL-MCNC: <0.05 NG/ML
PROT SERPL-MCNC: 7.2 G/DL (ref 6.4–8.2)
PROT UR STRIP-MCNC: NEGATIVE MG/DL
PROTHROMBIN TIME: 14.9 SECONDS (ref 11.6–14.5)
QRS AXIS: 54 DEGREES
QRSD INTERVAL: 70 MS
QT INTERVAL: 398 MS
QTC INTERVAL: 407 MS
RBC #/AREA URNS AUTO: NORMAL /HPF
RSV RNA RESP QL NAA+PROBE: NEGATIVE
SARS-COV-2 RNA RESP QL NAA+PROBE: NEGATIVE
SODIUM SERPL-SCNC: 136 MMOL/L (ref 136–145)
SP GR UR STRIP.AUTO: <=1.005 (ref 1–1.03)
T WAVE AXIS: 38 DEGREES
UROBILINOGEN UR QL STRIP.AUTO: 0.2 E.U./DL
VENTRICULAR RATE: 63 BPM
WBC #/AREA URNS AUTO: NORMAL /HPF

## 2021-11-30 PROCEDURE — 36415 COLL VENOUS BLD VENIPUNCTURE: CPT | Performed by: EMERGENCY MEDICINE

## 2021-11-30 PROCEDURE — 82550 ASSAY OF CK (CPK): CPT | Performed by: EMERGENCY MEDICINE

## 2021-11-30 PROCEDURE — 76870 US EXAM SCROTUM: CPT

## 2021-11-30 PROCEDURE — 87491 CHLMYD TRACH DNA AMP PROBE: CPT | Performed by: NURSE PRACTITIONER

## 2021-11-30 PROCEDURE — 83605 ASSAY OF LACTIC ACID: CPT | Performed by: EMERGENCY MEDICINE

## 2021-11-30 PROCEDURE — 99285 EMERGENCY DEPT VISIT HI MDM: CPT | Performed by: EMERGENCY MEDICINE

## 2021-11-30 PROCEDURE — 96361 HYDRATE IV INFUSION ADD-ON: CPT

## 2021-11-30 PROCEDURE — 93010 ELECTROCARDIOGRAM REPORT: CPT | Performed by: INTERNAL MEDICINE

## 2021-11-30 PROCEDURE — 99244 OFF/OP CNSLTJ NEW/EST MOD 40: CPT | Performed by: NURSE PRACTITIONER

## 2021-11-30 PROCEDURE — 99220 PR INITIAL OBSERVATION CARE/DAY 70 MINUTES: CPT

## 2021-11-30 PROCEDURE — 87591 N.GONORRHOEAE DNA AMP PROB: CPT | Performed by: NURSE PRACTITIONER

## 2021-11-30 PROCEDURE — 84484 ASSAY OF TROPONIN QUANT: CPT

## 2021-11-30 RX ORDER — LORATADINE 10 MG/1
5 TABLET ORAL DAILY
Status: DISCONTINUED | OUTPATIENT
Start: 2021-11-30 | End: 2021-12-01 | Stop reason: HOSPADM

## 2021-11-30 RX ORDER — LEVOFLOXACIN 5 MG/ML
750 INJECTION, SOLUTION INTRAVENOUS ONCE
Status: COMPLETED | OUTPATIENT
Start: 2021-11-30 | End: 2021-11-30

## 2021-11-30 RX ORDER — ALLOPURINOL 100 MG/1
100 TABLET ORAL DAILY
Status: DISCONTINUED | OUTPATIENT
Start: 2021-11-30 | End: 2021-12-01 | Stop reason: HOSPADM

## 2021-11-30 RX ORDER — LEVOFLOXACIN 5 MG/ML
750 INJECTION, SOLUTION INTRAVENOUS EVERY 24 HOURS
Status: DISCONTINUED | OUTPATIENT
Start: 2021-12-01 | End: 2021-12-01 | Stop reason: HOSPADM

## 2021-11-30 RX ORDER — PHENAZOPYRIDINE HYDROCHLORIDE 100 MG/1
100 TABLET, FILM COATED ORAL
Status: DISCONTINUED | OUTPATIENT
Start: 2021-11-30 | End: 2021-12-01 | Stop reason: HOSPADM

## 2021-11-30 RX ORDER — LORAZEPAM 0.5 MG/1
0.5 TABLET ORAL ONCE
Status: COMPLETED | OUTPATIENT
Start: 2021-12-01 | End: 2021-12-01

## 2021-11-30 RX ORDER — HYDRALAZINE HYDROCHLORIDE 20 MG/ML
5 INJECTION INTRAMUSCULAR; INTRAVENOUS EVERY 6 HOURS PRN
Status: DISCONTINUED | OUTPATIENT
Start: 2021-11-30 | End: 2021-12-01 | Stop reason: HOSPADM

## 2021-11-30 RX ORDER — PAROXETINE 10 MG/1
10 TABLET, FILM COATED ORAL DAILY
Status: DISCONTINUED | OUTPATIENT
Start: 2021-11-30 | End: 2021-12-01 | Stop reason: HOSPADM

## 2021-11-30 RX ORDER — LISINOPRIL 10 MG/1
40 TABLET ORAL DAILY
Status: DISCONTINUED | OUTPATIENT
Start: 2021-11-30 | End: 2021-12-01 | Stop reason: HOSPADM

## 2021-11-30 RX ORDER — METOPROLOL TARTRATE 50 MG/1
100 TABLET, FILM COATED ORAL EVERY 12 HOURS SCHEDULED
Status: DISCONTINUED | OUTPATIENT
Start: 2021-11-30 | End: 2021-12-01 | Stop reason: HOSPADM

## 2021-11-30 RX ORDER — LANOLIN ALCOHOL/MO/W.PET/CERES
6 CREAM (GRAM) TOPICAL
Status: DISCONTINUED | OUTPATIENT
Start: 2021-11-30 | End: 2021-12-01 | Stop reason: HOSPADM

## 2021-11-30 RX ORDER — ACETAMINOPHEN 325 MG/1
650 TABLET ORAL EVERY 6 HOURS PRN
Status: DISCONTINUED | OUTPATIENT
Start: 2021-11-30 | End: 2021-12-01 | Stop reason: HOSPADM

## 2021-11-30 RX ORDER — TAMSULOSIN HYDROCHLORIDE 0.4 MG/1
0.4 CAPSULE ORAL
Status: DISCONTINUED | OUTPATIENT
Start: 2021-11-30 | End: 2021-12-01 | Stop reason: HOSPADM

## 2021-11-30 RX ORDER — LORAZEPAM 0.5 MG/1
0.5 TABLET ORAL ONCE
Status: COMPLETED | OUTPATIENT
Start: 2021-11-30 | End: 2021-11-30

## 2021-11-30 RX ORDER — NICOTINE 21 MG/24HR
1 PATCH, TRANSDERMAL 24 HOURS TRANSDERMAL DAILY
Status: DISCONTINUED | OUTPATIENT
Start: 2021-11-30 | End: 2021-12-01 | Stop reason: HOSPADM

## 2021-11-30 RX ORDER — KETOROLAC TROMETHAMINE 30 MG/ML
15 INJECTION, SOLUTION INTRAMUSCULAR; INTRAVENOUS EVERY 6 HOURS SCHEDULED
Status: COMPLETED | OUTPATIENT
Start: 2021-11-30 | End: 2021-12-01

## 2021-11-30 RX ORDER — PRAVASTATIN SODIUM 40 MG
40 TABLET ORAL
Status: DISCONTINUED | OUTPATIENT
Start: 2021-11-30 | End: 2021-12-01 | Stop reason: HOSPADM

## 2021-11-30 RX ADMIN — IOHEXOL 100 ML: 350 INJECTION, SOLUTION INTRAVENOUS at 00:44

## 2021-11-30 RX ADMIN — PRAVASTATIN SODIUM 40 MG: 40 TABLET ORAL at 17:33

## 2021-11-30 RX ADMIN — APIXABAN 5 MG: 5 TABLET, FILM COATED ORAL at 08:32

## 2021-11-30 RX ADMIN — METOPROLOL TARTRATE 100 MG: 50 TABLET, FILM COATED ORAL at 08:32

## 2021-11-30 RX ADMIN — CEFTRIAXONE 500 MG: 1 INJECTION, POWDER, FOR SOLUTION INTRAMUSCULAR; INTRAVENOUS at 03:52

## 2021-11-30 RX ADMIN — KETOROLAC TROMETHAMINE 15 MG: 30 INJECTION, SOLUTION INTRAMUSCULAR at 17:45

## 2021-11-30 RX ADMIN — METOPROLOL TARTRATE 100 MG: 50 TABLET, FILM COATED ORAL at 21:53

## 2021-11-30 RX ADMIN — HYDRALAZINE HYDROCHLORIDE 5 MG: 20 INJECTION, SOLUTION INTRAMUSCULAR; INTRAVENOUS at 16:56

## 2021-11-30 RX ADMIN — APIXABAN 5 MG: 5 TABLET, FILM COATED ORAL at 17:44

## 2021-11-30 RX ADMIN — PHENAZOPYRIDINE 100 MG: 100 TABLET ORAL at 16:55

## 2021-11-30 RX ADMIN — PAROXETINE HYDROCHLORIDE 10 MG: 10 TABLET, FILM COATED ORAL at 08:32

## 2021-11-30 RX ADMIN — NICOTINE 1 PATCH: 21 PATCH, EXTENDED RELEASE TRANSDERMAL at 08:38

## 2021-11-30 RX ADMIN — LISINOPRIL 40 MG: 10 TABLET ORAL at 08:32

## 2021-11-30 RX ADMIN — LORAZEPAM 0.5 MG: 0.5 TABLET ORAL at 02:57

## 2021-11-30 RX ADMIN — KETOROLAC TROMETHAMINE 15 MG: 30 INJECTION, SOLUTION INTRAMUSCULAR at 12:17

## 2021-11-30 RX ADMIN — PHENAZOPYRIDINE 100 MG: 100 TABLET ORAL at 12:19

## 2021-11-30 RX ADMIN — LEVOFLOXACIN 750 MG: 5 INJECTION, SOLUTION INTRAVENOUS at 03:47

## 2021-11-30 RX ADMIN — LORATADINE 5 MG: 10 TABLET ORAL at 08:32

## 2021-11-30 RX ADMIN — TAMSULOSIN HYDROCHLORIDE 0.4 MG: 0.4 CAPSULE ORAL at 16:56

## 2021-11-30 RX ADMIN — ALLOPURINOL 100 MG: 100 TABLET ORAL at 08:32

## 2021-12-01 VITALS
SYSTOLIC BLOOD PRESSURE: 159 MMHG | RESPIRATION RATE: 16 BRPM | DIASTOLIC BLOOD PRESSURE: 92 MMHG | HEART RATE: 60 BPM | TEMPERATURE: 97.7 F | OXYGEN SATURATION: 98 %

## 2021-12-01 LAB
C TRACH DNA SPEC QL NAA+PROBE: NEGATIVE
N GONORRHOEA DNA SPEC QL NAA+PROBE: NEGATIVE
PROCALCITONIN SERPL-MCNC: 0.05 NG/ML

## 2021-12-01 PROCEDURE — 99217 PR OBSERVATION CARE DISCHARGE MANAGEMENT: CPT | Performed by: HOSPITALIST

## 2021-12-01 PROCEDURE — 36415 COLL VENOUS BLD VENIPUNCTURE: CPT | Performed by: EMERGENCY MEDICINE

## 2021-12-01 PROCEDURE — 84145 PROCALCITONIN (PCT): CPT | Performed by: EMERGENCY MEDICINE

## 2021-12-01 RX ORDER — POLYETHYLENE GLYCOL 3350 17 G/17G
17 POWDER, FOR SOLUTION ORAL DAILY
Status: DISCONTINUED | OUTPATIENT
Start: 2021-12-01 | End: 2021-12-01 | Stop reason: HOSPADM

## 2021-12-01 RX ORDER — LEVOFLOXACIN 500 MG/1
500 TABLET, FILM COATED ORAL EVERY 24 HOURS
Qty: 9 TABLET | Refills: 0 | Status: SHIPPED | OUTPATIENT
Start: 2021-12-01 | End: 2021-12-10

## 2021-12-01 RX ORDER — PHENAZOPYRIDINE HYDROCHLORIDE 100 MG/1
100 TABLET, FILM COATED ORAL
Qty: 10 TABLET | Refills: 0 | Status: SHIPPED | OUTPATIENT
Start: 2021-12-01 | End: 2021-12-02

## 2021-12-01 RX ORDER — TAMSULOSIN HYDROCHLORIDE 0.4 MG/1
0.4 CAPSULE ORAL
Qty: 30 CAPSULE | Refills: 0 | Status: SHIPPED | OUTPATIENT
Start: 2021-12-01 | End: 2021-12-31

## 2021-12-01 RX ADMIN — POLYETHYLENE GLYCOL 3350 17 G: 17 POWDER, FOR SOLUTION ORAL at 09:34

## 2021-12-01 RX ADMIN — METOPROLOL TARTRATE 100 MG: 50 TABLET, FILM COATED ORAL at 08:56

## 2021-12-01 RX ADMIN — KETOROLAC TROMETHAMINE 15 MG: 30 INJECTION, SOLUTION INTRAMUSCULAR at 04:51

## 2021-12-01 RX ADMIN — ALLOPURINOL 100 MG: 100 TABLET ORAL at 08:58

## 2021-12-01 RX ADMIN — HYDRALAZINE HYDROCHLORIDE 5 MG: 20 INJECTION, SOLUTION INTRAMUSCULAR; INTRAVENOUS at 00:51

## 2021-12-01 RX ADMIN — PHENAZOPYRIDINE 100 MG: 100 TABLET ORAL at 08:56

## 2021-12-01 RX ADMIN — KETOROLAC TROMETHAMINE 15 MG: 30 INJECTION, SOLUTION INTRAMUSCULAR at 00:43

## 2021-12-01 RX ADMIN — PAROXETINE HYDROCHLORIDE 10 MG: 10 TABLET, FILM COATED ORAL at 08:58

## 2021-12-01 RX ADMIN — LORAZEPAM 0.5 MG: 0.5 TABLET ORAL at 00:31

## 2021-12-01 RX ADMIN — APIXABAN 5 MG: 5 TABLET, FILM COATED ORAL at 08:55

## 2021-12-01 RX ADMIN — Medication 6 MG: at 00:31

## 2021-12-01 RX ADMIN — LORATADINE 5 MG: 10 TABLET ORAL at 08:57

## 2021-12-01 RX ADMIN — NICOTINE 1 PATCH: 21 PATCH, EXTENDED RELEASE TRANSDERMAL at 08:55

## 2021-12-01 RX ADMIN — LISINOPRIL 40 MG: 10 TABLET ORAL at 08:58

## 2021-12-05 LAB
BACTERIA BLD CULT: NORMAL
BACTERIA BLD CULT: NORMAL